# Patient Record
Sex: MALE | Race: WHITE | URBAN - METROPOLITAN AREA
[De-identification: names, ages, dates, MRNs, and addresses within clinical notes are randomized per-mention and may not be internally consistent; named-entity substitution may affect disease eponyms.]

---

## 2021-06-30 ENCOUNTER — OFFICE VISIT (OUTPATIENT)
Dept: ORTHOPEDIC SURGERY | Age: 41
End: 2021-06-30
Payer: COMMERCIAL

## 2021-06-30 VITALS — HEIGHT: 70 IN | BODY MASS INDEX: 24.05 KG/M2 | WEIGHT: 168 LBS

## 2021-06-30 DIAGNOSIS — M75.81 TENDINITIS OF RIGHT ROTATOR CUFF: ICD-10-CM

## 2021-06-30 DIAGNOSIS — M75.41 IMPINGEMENT SYNDROME OF RIGHT SHOULDER: ICD-10-CM

## 2021-06-30 DIAGNOSIS — M75.81 TENDINITIS OF RIGHT ROTATOR CUFF: Primary | ICD-10-CM

## 2021-06-30 DIAGNOSIS — M25.511 RIGHT SHOULDER PAIN, UNSPECIFIED CHRONICITY: Primary | ICD-10-CM

## 2021-06-30 DIAGNOSIS — M75.21 BICIPITAL TENDINITIS OF SHOULDER, RIGHT: ICD-10-CM

## 2021-06-30 PROCEDURE — 99204 OFFICE O/P NEW MOD 45 MIN: CPT | Performed by: PHYSICIAN ASSISTANT

## 2021-06-30 PROCEDURE — 20611 DRAIN/INJ JOINT/BURSA W/US: CPT | Performed by: PHYSICIAN ASSISTANT

## 2021-06-30 RX ORDER — TRIAMCINOLONE ACETONIDE 40 MG/ML
80 INJECTION, SUSPENSION INTRA-ARTICULAR; INTRAMUSCULAR ONCE
Status: COMPLETED | OUTPATIENT
Start: 2021-06-30 | End: 2021-06-30

## 2021-06-30 RX ORDER — BUPIVACAINE HYDROCHLORIDE 2.5 MG/ML
10 INJECTION, SOLUTION INFILTRATION; PERINEURAL ONCE
Status: COMPLETED | OUTPATIENT
Start: 2021-06-30 | End: 2021-06-30

## 2021-06-30 RX ORDER — LIDOCAINE HYDROCHLORIDE 10 MG/ML
40 INJECTION, SOLUTION INFILTRATION; PERINEURAL ONCE
Status: COMPLETED | OUTPATIENT
Start: 2021-06-30 | End: 2021-06-30

## 2021-06-30 RX ADMIN — BUPIVACAINE HYDROCHLORIDE 10 MG: 2.5 INJECTION, SOLUTION INFILTRATION; PERINEURAL at 08:33

## 2021-06-30 RX ADMIN — LIDOCAINE HYDROCHLORIDE 40 MG: 10 INJECTION, SOLUTION INFILTRATION; PERINEURAL at 08:35

## 2021-06-30 RX ADMIN — TRIAMCINOLONE ACETONIDE 80 MG: 40 INJECTION, SUSPENSION INTRA-ARTICULAR; INTRAMUSCULAR at 08:35

## 2021-06-30 NOTE — PROGRESS NOTES
of Transportation (Non-Medical):    Physical Activity:     Days of Exercise per Week:     Minutes of Exercise per Session:    Stress:     Feeling of Stress :    Social Connections:     Frequency of Communication with Friends and Family:     Frequency of Social Gatherings with Friends and Family:     Attends Hinduism Services:     Active Member of Clubs or Organizations:     Attends Club or Organization Meetings:     Marital Status:    Intimate Partner Violence:     Fear of Current or Ex-Partner:     Emotionally Abused:     Physically Abused:     Sexually Abused:      No current outpatient medications on file. Current Facility-Administered Medications   Medication Dose Route Frequency Provider Last Rate Last Admin    bupivacaine (MARCAINE) 0.25 % injection 10 mg  10 mg Intra-articular Once Charmaine Owens PA-C        lidocaine 1 % injection 40 mg  40 mg Intra-articular Once Charmaine Owens PA-C        triamcinolone acetonide (KENALOG-40) injection 80 mg  80 mg Intra-articular Once Charmaine Owens PA-C           Review of Systems:  Relevant 12 point review of systems dated 6/30/2021 was reviewed and are available in the patient's chart under the media tab. Vital Signs:  Ht 5' 10\" (1.778 m)   Wt 168 lb (76.2 kg)   BMI 24.11 kg/m²     General Exam:   Constitutional: Patient is adequately groomed with no evidence of malnutrition  DTRs: Deep tendon reflexes are intact  Mental Status: The patient is oriented to time, place and person. The patient's mood and affect are appropriate. Lymphatic: The lymphatic examination bilaterally reveals all areas to be without enlargement or induration. Vascular: Examination reveals no swelling or calf tenderness. Peripheral pulses are palpable and 2+. Neurological: The patient has good coordination. There is no weakness or sensory deficit. Right shoulder Examination:    Inspection:  No rashes, scars, or lesions. No deformity or atrophy.      Palpation: No tenderness over the bicipital groove or AC joint. Range of Motion: 170 degrees of forward flexion, 40 degrees of external rotation, internal rotation L2    Strength: 5/5 strength with internal rotation, external rotation, elevation, and abduction. Biceps and triceps strength is 5/5. Special Tests:  Positive Chambers and Neer impingement exam.  Negative speed sign. Negative crossover examination. Skin: There are no rashes, ulcerations or lesions. Gait: Normal gait pattern    Reflex normal deep tendon reflexes    Additional Comments:       Additional Examinations:         Contralateral Exam: Examination of the left shoulder reveals no atrophy or deformity. Skin is warm and dry. Range of motion is within normal limits. There is no focal tenderness with palpation. No AC joint tenderness. Negative Neer and Chambers-Gopal exams. Strength is graded 5/5 throughout. Neck: Examination of the neck does not show any tenderness, deformity or injury. Range of motion is unremarkable. There is no gross instability. There are no rashes, ulcerations or lesions. Strength and tone are normal.    Radiology:     X-rays obtained and reviewed in office:  Views 3 views including AP, Y, axillary  Location right shoulder  Impression there is a well-maintained glenohumeral joint with minimal arthritic changes. No fractures or dislocations. Impression:  Encounter Diagnoses   Name Primary?     Right shoulder pain, unspecified chronicity Yes    Tendinitis of right rotator cuff     Bicipital tendinitis of shoulder, right     Impingement syndrome of right shoulder        Office Procedures:  Orders Placed This Encounter   Procedures    XR SHOULDER RIGHT (MIN 2 VIEWS)     Standing Status:   Future     Number of Occurrences:   1     Standing Expiration Date:   6/30/2022     Order Specific Question:   Reason for exam:     Answer:   PAIN    US ARTHR/ASP/INJ MAJOR JNT/BURSA RIGHT     Standing Status:   Future     Number tendonitis. We reviewed the natural history of these conditions and treatment options ranging from conservative measures (rest, icing, activity modification, physical therapy, pain meds, cortisone injection) to surgical options. In terms of treatment, I recommended continuing with rest, icing, avoidance of painful activities, NSAIDs or pain meds as tolerated, and physical therapy. If these are not effective, cortisone injection can be considered. We discussed surgical options as well, should conservative measures fail.

## 2021-07-02 ENCOUNTER — HOSPITAL ENCOUNTER (OUTPATIENT)
Dept: PHYSICAL THERAPY | Age: 41
Setting detail: THERAPIES SERIES
Discharge: HOME | End: 2021-07-02
Payer: COMMERCIAL

## 2021-07-02 PROCEDURE — 97110 THERAPEUTIC EXERCISES: CPT | Performed by: PHYSICAL THERAPIST

## 2021-07-02 PROCEDURE — 97161 PT EVAL LOW COMPLEX 20 MIN: CPT | Performed by: PHYSICAL THERAPIST
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
## 2021-07-02 NOTE — FLOWSHEET NOTE
[facility], [address]  Phone: [phone]  Fax [phone]        Date:  2021    Patient Name:  Charline Friedman    :  1980  MRN: [id number]  Restrictions/Precautions:    Medical/Treatment Diagnosis Information:  · Diagnosis: right RTC tendinitis M75.81, right bicipital tendinitis M75.21  · Treatment Diagnosis: Shoulder pain X50.779  Insurance/Certification information:  PT Insurance Information: [address] deductible 80/20  0copay   60 PT/OT  Physician Information:  Referring Practitioner: Constanza Lala PA-C  Has the plan of care been signed (Y/N):        []  Yes  [x]  No     Date of Patient follow up with Physician: nsv      Is this a Progress Report:     []  Yes  [x]  No        If Yes:  Date Range for reporting period:  Beginnin21  Ending:    Progress report will be due (10 Rx or 30 days whichever is less): 86/       Recertification will be due (POC Duration  / 90 days whichever is less):       Visit # Insurance Allowable Auth Required   In-person 1 60 []  Yes []  No    Telehealth   []  Yes []  No    Total          Therapy Diagnosis/Practice Pattern:E       Number of Comorbidities:  [x]0     []1-2    []3+    Latex Allergy:  [x]NO      []YES  Preferred Language for Healthcare:   [x]English       []other:    SUBJECTIVE:  See eval    OBJECTIVE: See eval   Observation:    Test measurements:      RESTRICTIONS/PRECAUTIONS: n/a    Exercises/Interventions:     Therapeutic Ex (79086) Sets/sec Reps Notes/CUES         Serratus punch 10# 2 x 10  hep   Post cap s :20 3x  hep   SL ERC  3# 2 x 10  Hep    Prone ext 3# 2 x 10 Hep   Prone horiz abd 3# 2 x 10 hep   No $ GR  X 20 hep                                       Manual Intervention (.27.97.60)                                          NMR re-education (80216)   CUES NEEDED                                       Therapeutic Activity (03534)      Safe zone of shoulder reviewed  X  handout   Pt encouraged to lift LE. Pt cleared to do Rows. Pt free to use elliptical and TM. Cautioned about bike. Access Code: [email or web address]  URL: Groove/  Date: 81/89/2732  Prepared by: Mathew Rodrigues    Exercises  Supine Scapular Protraction in Flexion with Dumbbells - 1 x daily - 7 x weekly - 3 sets - 10 reps  Supine Cross Body Shoulder Stretch - 1 x daily - 7 x weekly - 1 sets - 3 reps - 20 hold  Sidelying Shoulder External Rotation - 1 x daily - 7 x weekly - 3 sets - 10 reps - 2 hold  Prone Shoulder Extension - Single Arm - 1 x daily - 7 x weekly - 2 sets - 10 reps - 2 hold  Prone Single Arm Shoulder Horizontal Abduction with Scapular Retraction and Palm Down - 1 x daily - 7 x weekly - 2 sets - 10 reps - 2 hold  Shoulder External Rotation and Scapular Retraction with Resistance - 1 x daily - 7 x weekly - 2 sets - 10 reps    Therapeutic Exercise and NMR EXR  [x] (52160) Provided verbal/tactile cueing for activities related to strengthening, flexibility, endurance, ROM  for improvements in scapular, scapulothoracic and UE control with self care, reaching, carrying, lifting, house/yardwork, driving/computer work.    [] (15636) Provided verbal/tactile cueing for activities related to improving balance, coordination, kinesthetic sense, posture, motor skill, proprioception  to assist with  scapular, scapulothoracic and UE control with self care, reaching, carrying, lifting, house/yardwork, driving/computer work. Therapeutic Activities:    [] (14368 or 89823) Provided verbal/tactile cueing for activities related to improving balance, coordination, kinesthetic sense, posture, motor skill, proprioception and motor activation to allow for proper function of scapular, scapulothoracic and UE control with self care, carrying, lifting, driving/computer work.      Home Exercise Program:    [x] (07825) Reviewed/Progressed HEP activities related to strengthening, flexibility, endurance, ROM of scapular, scapulothoracic and UE control with self care, reaching, carrying, lifting, house/yardwork, driving/computer work  [] (31099) Reviewed/Progressed HEP activities related to improving balance, coordination, kinesthetic sense, posture, motor skill, proprioception of scapular, scapulothoracic and UE control with self care, reaching, carrying, lifting, house/yardwork, driving/computer work      Manual Treatments:  PROM / STM / Oscillations-Mobs:  G-I, II, III, IV (PA's, Inf., Post.)  [x] (89430) Provided manual therapy to mobilize soft tissue/joints of cervical/CT, scapular GHJ and UE for the purpose of modulating pain, promoting relaxation,  increasing ROM, reducing/eliminating soft tissue swelling/inflammation/restriction, improving soft tissue extensibility and allowing for proper ROM for normal function with self care, reaching, carrying, lifting, house/yardwork, driving/computer work    Modalities:  N/a    Charges  Timed Code Treatment Minutes: 20   Total Treatment Minutes: 45     [x] EVAL (LOW) 65556   [] EVAL (MOD) 93286   [] EVAL (HIGH) 67817   [] RE-EVAL     [x] LA(96222) x   1  [] IONTO  [] NMR (65615) x     [] VASO  [] Manual (53369) x      [] Other:  [] TA x      [] Mech Traction (34265)  [] ES(attended) (60048)      [] ES (un) (70976):     GOALS:  Patient stated goal: improve shoulder. Learn healthy lifting habits. [] Progressing: [] Met: [] Not Met: [] Adjusted    Therapist goals for Patient:   Short Term Goals: To be achieved in: 2 weeks  1. Independent in HEP and progression per patient tolerance, in order to prevent re-injury. [] Progressing: [] Met: [] Not Met: [] Adjusted  2. Patient will have a decrease in pain to facilitate improvement in movement, function, and ADLs as indicated by Functional Deficits. [] Progressing: [] Met: [] Not Met: [] Adjusted    Long Term Goals: To be achieved in: 6 weeks  1.  Disability index score of 8% or less for the [facility] to assist with reaching prior level of function. [] Progressing: [] Met: [] Not Met: [] Adjusted  2. Patient will demonstrate increased AROM to be painfree with end range flex and IR to allow for proper joint functioning as indicated by patients Functional Deficits. [] Progressing: [] Met: [] Not Met: [] Adjusted  3. Patient will demonstrate an increase in Strength to 5/5 throughout to allow for proper functional mobility as indicated by patients Functional Deficits. [] Progressing: [] Met: [] Not Met: [] Adjusted  4. Patient will return to weight training with son without increased symptoms or restriction. [] Progressing: [] Met: [] Not Met: [] Adjusted  5. Able to reach behind back to tuck in shirt. Able to sleep 8 hours without waking from shoulder pain. [] Progressing: [] Met: [] Not Met: [] Adjusted     Progression Towards Functional goals:  [] Patient is progressing as expected towards functional goals listed. [] Progression is slowed due to complexities listed. [] Progression has been slowed due to co-morbidities. [x] Plan just implemented, too soon to assess goals progression  [] Other:     ASSESSMENT:  See eval    Overall Progression Towards Functional goals/ Treatment Progress Update:  [] Patient is progressing as expected towards functional goals listed. [] Progression is slowed due to complexities/Impairments listed. [] Progression has been slowed due to co-morbidities.   [x] Plan just implemented, too soon to assess goals progression <30days   [] Goals require adjustment due to lack of progress  [] Patient is not progressing as expected and requires additional follow up with physician  [] Other    Prognosis for POC: [x] Good [] Fair  [] Poor      Patient requires continued skilled intervention: [x] Yes  [] No    Treatment/Activity Tolerance:  [x] Patient able to complete treatment  [] Patient limited by fatigue  [] Patient limited by pain    [] Patient limited by other medical complications  [] Other:     Return to Play: (if applicable)   [x]  Stage 1: Intro to Strength   []  Stage 2: Return to Run and Strength   []  Stage 3: Return to Jump and Strength   []  Stage 4: Dynamic Strength and Agility   []  Stage 5: Sport Specific Training     []  Ready to Return to Play, Meets All Above Stages   []  Not Ready for Return to Sports   Comments:                           PLAN: See eval  [] Continue per plan of care [] Alter current plan (see comments above)  [x] Plan of care initiated [] Hold pending MD visit [] Discharge      Electronically signed by:  Nixon Ravi PT    Note: If patient does not return for scheduled/ recommended follow up visits, this note will serve as a discharge from care along with most recent update on progress.

## 2021-07-16 ENCOUNTER — HOSPITAL ENCOUNTER (OUTPATIENT)
Dept: PHYSICAL THERAPY | Age: 41
Setting detail: THERAPIES SERIES
Discharge: HOME | End: 2021-07-16
Payer: COMMERCIAL

## 2021-07-16 PROCEDURE — 97110 THERAPEUTIC EXERCISES: CPT | Performed by: PHYSICAL THERAPIST

## 2021-07-16 NOTE — FLOWSHEET NOTE
[facility], [address]  Phone: [phone]  Fax [phone]        Date:  2021    Patient Name:  Juan Carlos Gomes    :  1980  MRN: [id number]  Restrictions/Precautions:    Medical/Treatment Diagnosis Information:  · Diagnosis: right RTC tendinitis M75.81, right bicipital tendinitis M75.21  · Treatment Diagnosis: Shoulder pain O15.864  Insurance/Certification information:  PT Insurance Information: [employer] deductible 80/20  0copay   60 PT/OT  Physician Information:  Referring Practitioner: René Groves PA-C  Has the plan of care been signed (Y/N):        []  Yes  [x]  No     Date of Patient follow up with Physician: nsv      Is this a Progress Report:     []  Yes  [x]  No        If Yes:  Date Range for reporting period:  Beginnin21  Ending:    Progress report will be due (10 Rx or 30 days whichever is less): /       Recertification will be due (POC Duration  / 90 days whichever is less):       Visit # Insurance Allowable Auth Required   In-person 2 60 []  Yes []  No    Telehealth   []  Yes []  No    Total          Therapy Diagnosis/Practice Pattern:E       Number of Comorbidities:  [x]0     []1-2    []3+    Latex Allergy:  [x]NO      []YES  Preferred Language for Healthcare:   [x]English       []other:    SUBJECTIVE:  Pt is pleased with progress. Pt has very little pain.   Feels like he is ready to return to gym program.    OBJECTIVE: See eval   Observation:    Test measurements:      RESTRICTIONS/PRECAUTIONS: n/a    Exercises/Interventions:     Therapeutic Ex (52753) Sets/sec Reps Notes/CUES         Serratus punch 10# 2 x 10  hep   Post cap s :20 3x  hep   SL ERC  5# 2 x 10  Hep    Prone ext 5# 2 x 10 Hep   Prone horiz abd 5# 2 x 10 hep   No $ BL  X 20 hep   TB IR/  ER BL X 20     Supine horiz abd BL X 20    Supine PNF BL X 20     CC Row 110# 2 x 10    CC # 2 x 10    CC triceps 80# driving/computer work.    [] (67207) Provided verbal/tactile cueing for activities related to improving balance, coordination, kinesthetic sense, posture, motor skill, proprioception  to assist with  scapular, scapulothoracic and UE control with self care, reaching, carrying, lifting, house/yardwork, driving/computer work. Therapeutic Activities:    [] (62114 or 57046) Provided verbal/tactile cueing for activities related to improving balance, coordination, kinesthetic sense, posture, motor skill, proprioception and motor activation to allow for proper function of scapular, scapulothoracic and UE control with self care, carrying, lifting, driving/computer work.      Home Exercise Program:    [x] (43928) Reviewed/Progressed HEP activities related to strengthening, flexibility, endurance, ROM of scapular, scapulothoracic and UE control with self care, reaching, carrying, lifting, house/yardwork, driving/computer work  [] (83959) Reviewed/Progressed HEP activities related to improving balance, coordination, kinesthetic sense, posture, motor skill, proprioception of scapular, scapulothoracic and UE control with self care, reaching, carrying, lifting, house/yardwork, driving/computer work      Manual Treatments:  PROM / STM / Oscillations-Mobs:  G-I, II, III, IV (PA's, Inf., Post.)  [x] (52459) Provided manual therapy to mobilize soft tissue/joints of cervical/CT, scapular GHJ and UE for the purpose of modulating pain, promoting relaxation,  increasing ROM, reducing/eliminating soft tissue swelling/inflammation/restriction, improving soft tissue extensibility and allowing for proper ROM for normal function with self care, reaching, carrying, lifting, house/yardwork, driving/computer work    Modalities:  N/a    Charges  Timed Code Treatment Minutes: 35   Total Treatment Minutes: 35     [] EVAL (LOW) 95232   [] EVAL (MOD) 89855   [] EVAL (HIGH) 65743   [] RE-EVAL     [x] SLOAN(08709) x   2  [] IONTO  [] NMR (50392) x     [] VASO  [x] Manual (96695) x      [] Other:  [] TA x      [] Mech Traction (55464)  [] ES(attended) (43344)      [] ES (un) (22620):     GOALS:  Patient stated goal: improve shoulder. Learn healthy lifting habits. [] Progressing: [] Met: [] Not Met: [] Adjusted    Therapist goals for Patient:   Short Term Goals: To be achieved in: 2 weeks  1. Independent in HEP and progression per patient tolerance, in order to prevent re-injury. [] Progressing: [] Met: [] Not Met: [] Adjusted  2. Patient will have a decrease in pain to facilitate improvement in movement, function, and ADLs as indicated by Functional Deficits. [] Progressing: [] Met: [] Not Met: [] Adjusted    Long Term Goals: To be achieved in: 6 weeks  1. Disability index score of 8% or less for the [facility] to assist with reaching prior level of function. [] Progressing: [] Met: [] Not Met: [] Adjusted  2. Patient will demonstrate increased AROM to be painfree with end range flex and IR to allow for proper joint functioning as indicated by patients Functional Deficits. [] Progressing: [] Met: [] Not Met: [] Adjusted  3. Patient will demonstrate an increase in Strength to 5/5 throughout to allow for proper functional mobility as indicated by patients Functional Deficits. [] Progressing: [] Met: [] Not Met: [] Adjusted  4. Patient will return to weight training with son without increased symptoms or restriction. [] Progressing: [] Met: [] Not Met: [] Adjusted  5. Able to reach behind back to tuck in shirt. Able to sleep 8 hours without waking from shoulder pain. [] Progressing: [] Met: [] Not Met: [] Adjusted     Progression Towards Functional goals:  [x] Patient is progressing as expected towards functional goals listed. [] Progression is slowed due to complexities listed. [] Progression has been slowed due to co-morbidities.   [] Plan just implemented, too soon to assess goals progression  [] Other:     ASSESSMENT:   Pt may benefit from [facility] to meet all goals. Overall Progression Towards Functional goals/ Treatment Progress Update:  [x] Patient is progressing as expected towards functional goals listed. [] Progression is slowed due to complexities/Impairments listed. [] Progression has been slowed due to co-morbidities. [] Plan just implemented, too soon to assess goals progression <30days   [] Goals require adjustment due to lack of progress  [] Patient is not progressing as expected and requires additional follow up with physician  [] Other    Prognosis for POC: [x] Good [] Fair  [] Poor      Patient requires continued skilled intervention: [x] Yes  [] No    Treatment/Activity Tolerance:  [x] Patient able to complete treatment  [] Patient limited by fatigue  [] Patient limited by pain    [] Patient limited by other medical complications  [] Other:     Return to Play: (if applicable)   [x]  Stage 1: Intro to Strength   []  Stage 2: Return to Run and Strength   []  Stage 3: Return to Jump and Strength   []  Stage 4: Dynamic Strength and Agility   []  Stage 5: Sport Specific Training     []  Ready to Return to Play, Meets All Above Stages   []  Not Ready for Return to Sports   Comments:                           PLAN: See eval  [x] Continue per plan of care [] Alter current plan (see comments above)  [] Plan of care initiated [] Hold pending MD visit [] Discharge      Electronically signed by:  Joanie Vaca PT    Note: If patient does not return for scheduled/ recommended follow up visits, this note will serve as a discharge from care along with most recent update on progress.

## 2021-08-02 ENCOUNTER — OFFICE VISIT (OUTPATIENT)
Dept: ORTHOPEDIC SURGERY | Age: 41
End: 2021-08-02
Payer: COMMERCIAL

## 2021-08-02 VITALS — HEIGHT: 70 IN | WEIGHT: 168 LBS | BODY MASS INDEX: 24.05 KG/M2

## 2021-08-02 DIAGNOSIS — T14.8XXA HEMATOMA: ICD-10-CM

## 2021-08-02 DIAGNOSIS — S46.211A STRAIN OF RIGHT BICEPS, INITIAL ENCOUNTER: ICD-10-CM

## 2021-08-02 DIAGNOSIS — S46.011A STRAIN OF RIGHT ROTATOR CUFF CAPSULE, INITIAL ENCOUNTER: ICD-10-CM

## 2021-08-02 DIAGNOSIS — M25.511 RIGHT SHOULDER PAIN, UNSPECIFIED CHRONICITY: Primary | ICD-10-CM

## 2021-08-02 PROCEDURE — 99214 OFFICE O/P EST MOD 30 MIN: CPT | Performed by: PHYSICIAN ASSISTANT

## 2021-08-02 NOTE — PROGRESS NOTES
 Lack of Transportation (Medical):  Lack of Transportation (Non-Medical):    Physical Activity:     Days of Exercise per Week:     Minutes of Exercise per Session:    Stress:     Feeling of Stress :    Social Connections:     Frequency of Communication with Friends and Family:     Frequency of Social Gatherings with Friends and Family:     Attends Baptism Services:     Active Member of Clubs or Organizations:     Attends Club or Organization Meetings:     Marital Status:    Intimate Partner Violence:     Fear of Current or Ex-Partner:     Emotionally Abused:     Physically Abused:     Sexually Abused:        Review of Systems:  Relevant 12 point review of systems dated 8/2/2021 was reviewed and are available in the patient's chart under the media tab. Vital Signs:  Ht 5' 10\" (1.778 m)   Wt 168 lb (76.2 kg)   BMI 24.11 kg/m²     General Exam:   Constitutional: Patient is adequately groomed with no evidence of malnutrition  DTRs: Deep tendon reflexes are intact  Mental Status: The patient is oriented to time, place and person. The patient's mood and affect are appropriate. Lymphatic: The lymphatic examination bilaterally reveals all areas to be without enlargement or induration. Vascular: Examination reveals no swelling or calf tenderness. Peripheral pulses are palpable and 2+. Neurological: The patient has good coordination. There is no weakness or sensory deficit. Right shoulder Examination:    Inspection:  No rashes, scars, or lesions. No deformity or atrophy. Patient does have a significant amount of ecchymosis involving the majority of the right upper extremity. Palpation: Tenderness directly over the belly of the bicep muscle. Minimal tenderness over the distal tendon and proximal tendon.     Range of Motion: 170 degrees of forward flexion, 70 degrees of external rotation, internal rotation L1    Strength: 5/5 strength with internal rotation, external rotation, elevation, and abduction. Biceps and triceps strength is 5/5. Special Tests:  Positive Chambers and Neer impingement exam.  Negative speed sign. Negative crossover examination. Negative hook test for distal bicep tendon tear. Negative Edward sign. Skin: There are no rashes, ulcerations or lesions. Gait: Normal gait pattern    Reflex normal deep tendon reflexes    Additional Comments:       Additional Examinations:         Contralateral Exam: Examination of the left shoulder reveals no atrophy or deformity. Skin is warm and dry. Range of motion is within normal limits. There is no focal tenderness with palpation. No AC joint tenderness. Negative Neer and Chambers-Gopal exams. Strength is graded 5/5 throughout. Neck: Examination of the neck does not show any tenderness, deformity or injury. Range of motion is unremarkable. There is no gross instability. There are no rashes, ulcerations or lesions. Strength and tone are normal.    Radiology:     X-rays obtained and reviewed in office:  Views 3 views including AP, Y, axillary  Location right shoulder  Impression there is a well-maintained glenohumeral joint with minimal arthritic changes. No fractures or dislocations. Impression:  Encounter Diagnoses   Name Primary?     Right shoulder pain, unspecified chronicity Yes    Strain of right rotator cuff capsule, initial encounter     Strain of right biceps, initial encounter     Hematoma        Office Procedures:  Orders Placed This Encounter   Procedures    XR SHOULDER RIGHT (MIN 2 VIEWS)     Standing Status:   Future     Number of Occurrences:   1     Standing Expiration Date:   8/2/2022     Order Specific Question:   Reason for exam:     Answer:   pain    MRI HUMERUS LEFT WO CONTRAST     Standing Status:   Future     Standing Expiration Date:   8/2/2022     Scheduling Instructions:      [facility]     Order Specific Question:   Reason for exam:     Answer:   R/O BICEPS TENDON TEAR Treatment Plan: Patient is definitely suffering from a hematoma. It is unclear with the amount of swelling he is having where exactly his injury is. We have ordered an MRI of the humerus and the patient will follow up in office after MRI. He is informed that based on this MRI he may need a more dedicated film either distal approximately. We will see the patient back in office after MRI. In the meantime continue to rest ice and elevate for pain and swelling control. I discussed with Callie Garcia that his history, symptoms, signs and imaging are most consistent with hematoma and bicep injury    We reviewed the natural history of these conditions and treatment options ranging from conservative measures (rest, icing, activity modification, physical therapy, pain meds, cortisone injection) to surgical options. Based on his symptoms I recommended the following imaging studies: MRI. Script was provided. After imaging is completed, patient will make a follow up appointment to review imaging. In terms of treatment, I recommended continuing with rest, icing, avoidance of painful activities, NSAIDs or pain meds as tolerated, and physical therapy. We discussed surgical options as well, should conservative measures fail.

## 2021-08-04 ENCOUNTER — TELEPHONE (OUTPATIENT)
Dept: ORTHOPEDIC SURGERY | Age: 41
End: 2021-08-04

## 2021-08-04 NOTE — TELEPHONE ENCOUNTER
General Question     Subject: REQ MRI ORDER APPROVAL CAN BE EXPEDITED FOR R [facility]SCARR   Patient and /or Facility Request: PATIENT   Contact Number: [phone]    TO EXPEDITE ORDER THIS IS THE CONTACT # [phone]

## 2021-08-09 ENCOUNTER — OFFICE VISIT (OUTPATIENT)
Dept: ORTHOPEDIC SURGERY | Age: 41
End: 2021-08-09
Payer: COMMERCIAL

## 2021-08-09 VITALS — HEIGHT: 70 IN | BODY MASS INDEX: 24.05 KG/M2 | WEIGHT: 168 LBS

## 2021-08-09 DIAGNOSIS — S46.211A STRAIN OF RIGHT BICEPS, INITIAL ENCOUNTER: ICD-10-CM

## 2021-08-09 DIAGNOSIS — S46.011A STRAIN OF RIGHT ROTATOR CUFF CAPSULE, INITIAL ENCOUNTER: ICD-10-CM

## 2021-08-09 DIAGNOSIS — M75.21 BICIPITAL TENDINITIS OF SHOULDER, RIGHT: ICD-10-CM

## 2021-08-09 DIAGNOSIS — T14.8XXA HEMATOMA: ICD-10-CM

## 2021-08-09 DIAGNOSIS — M25.511 RIGHT SHOULDER PAIN, UNSPECIFIED CHRONICITY: Primary | ICD-10-CM

## 2021-08-09 PROCEDURE — 99213 OFFICE O/P EST LOW 20 MIN: CPT | Performed by: PHYSICIAN ASSISTANT

## 2021-08-09 NOTE — PROGRESS NOTES
Chief Complaint    Follow-up (Shoulder Right )      History of Present Illness:  Juan Rodriguez is a 38 y.o. male presents today for follow-up visit. Patient is here concerning his right shoulder. Approximately 12 days ago he suffered the below injury. We saw him last time and ordered him an MRI. The MRI has not yet been approved. He states that his swelling has improved but continues to have a significant amount of ecchymosis. He does have difficulties with overhead activities. He has noticed the deformity throughout his bicep. Previous history: Patient presents to the office today for a new problem. Patient is here with a chief complaint of right shoulder pain. Patient's right shoulder became painful approximately 5 days ago. He was knee boarding and got tangled up in the rope. It wrapped around his arm and into his wrist.  He did have x-rays taken in [place] but does not have them with him. Patient was informed there was no fracture. His pain is more concentrated over the mid humerus. He does complain of a significant amount of ecchymosis. His symptoms are improving with conservative care of ibuprofen and rest.  He does have a past medical history significant for bicipital tendinitis and shoulder impingement. This was treated successfully with a cortisone injection and physical therapy. Medical History:  History reviewed. No pertinent past medical history. There are no problems to display for this patient. History reviewed. No pertinent surgical history. History reviewed. No pertinent family history.   Social History     Socioeconomic History    Marital status: Single     Spouse name: None    Number of children: None    Years of education: None    Highest education level: None   Occupational History    None   Tobacco Use    Smoking status: Never Smoker    Smokeless tobacco: Never Used   Substance and Sexual Activity    Alcohol use: None    Drug use: None    Sexual activity: None   Other Topics Concern    None   Social History Narrative    None     Social Determinants of Health     Financial Resource Strain:     Difficulty of Paying Living Expenses:    Food Insecurity:     Worried About Running Out of Food in the Last Year:     [address] in the Last Year:    Transportation Needs:     Lack of Transportation (Medical):  Lack of Transportation (Non-Medical):    Physical Activity:     Days of Exercise per Week:     Minutes of Exercise per Session:    Stress:     Feeling of Stress :    Social Connections:     Frequency of Communication with Friends and Family:     Frequency of Social Gatherings with Friends and Family:     Attends Zoroastrianism Services:     Active Member of Clubs or Organizations:     Attends Club or Organization Meetings:     Marital Status:    Intimate Partner Violence:     Fear of Current or Ex-Partner:     Emotionally Abused:     Physically Abused:     Sexually Abused:        Review of Systems:  Relevant 12 point review of systems dated 8/2/2021 was reviewed and are available in the patient's chart under the media tab. Vital Signs:  Ht 5' 10\" (1.778 m)   Wt 168 lb (76.2 kg)   BMI 24.11 kg/m²     General Exam:   Constitutional: Patient is adequately groomed with no evidence of malnutrition  DTRs: Deep tendon reflexes are intact  Mental Status: The patient is oriented to time, place and person. The patient's mood and affect are appropriate. Lymphatic: The lymphatic examination bilaterally reveals all areas to be without enlargement or induration. Vascular: Examination reveals no swelling or calf tenderness. Peripheral pulses are palpable and 2+. Neurological: The patient has good coordination. There is no weakness or sensory deficit. Right shoulder Examination:    Inspection:  No rashes, scars, or lesions. No deformity or atrophy.   Patient does have a significant amount of ecchymosis involving the majority of the right upper extremity. Palpation: Tenderness directly over the belly of the bicep muscle. Minimal tenderness over the distal tendon and proximal tendon. Range of Motion: 170 degrees of forward flexion, 70 degrees of external rotation, internal rotation L1    Strength: 5/5 strength with internal rotation, external rotation, elevation, and abduction. Biceps and triceps strength is 5/5. Special Tests:  Positive Chambers and Neer impingement exam.  Negative speed sign. Negative crossover examination. Negative hook test for distal bicep tendon tear. Negative Edward sign. Skin: There are no rashes, ulcerations or lesions. Gait: Normal gait pattern    Reflex normal deep tendon reflexes    Additional Comments:       Additional Examinations:         Contralateral Exam: Examination of the left shoulder reveals no atrophy or deformity. Skin is warm and dry. Range of motion is within normal limits. There is no focal tenderness with palpation. No AC joint tenderness. Negative Neer and Chambers-Gopal exams. Strength is graded 5/5 throughout. Neck: Examination of the neck does not show any tenderness, deformity or injury. Range of motion is unremarkable. There is no gross instability. There are no rashes, ulcerations or lesions. Strength and tone are normal.    Radiology:     Previous X-rays obtained and reviewed in office:  Views 3 views including AP, Y, axillary  Location right shoulder  Impression there is a well-maintained glenohumeral joint with minimal arthritic changes. No fractures or dislocations. Impression:  Encounter Diagnoses   Name Primary?  Right shoulder pain, unspecified chronicity Yes    Bicipital tendinitis of shoulder, right     Strain of right rotator cuff capsule, initial encounter     Strain of right biceps, initial encounter     Hematoma        Office Procedures:  No orders of the defined types were placed in this encounter.       Treatment Plan:       As per last note I do believe patient has a significant right upper extremity injury and is in need of an MRI. With the injury now being 15days old there is some urgency to this. The longer the patient goes without a definitive diagnosis will make it harder to treat his problem. Patient is definitely suffering from a hematoma. It is unclear with the amount of swelling he is having where exactly his injury is. We have ordered an MRI of the humerus and the patient will follow up in office after MRI. He is informed that based on this MRI he may need a more dedicated film either distal approximately. We will see the patient back in office after MRI. In the meantime continue to rest ice and elevate for pain and swelling control. I discussed with Lorin Olvera that his history, symptoms, signs and imaging are most consistent with hematoma and bicep injury    We reviewed the natural history of these conditions and treatment options ranging from conservative measures (rest, icing, activity modification, physical therapy, pain meds, cortisone injection) to surgical options. Based on his symptoms I recommended the following imaging studies: MRI. Script was provided. After imaging is completed, patient will make a follow up appointment to review imaging. In terms of treatment, I recommended continuing with rest, icing, avoidance of painful activities, NSAIDs or pain meds as tolerated, and physical therapy. We discussed surgical options as well, should conservative measures fail.

## 2021-08-12 DIAGNOSIS — S46.011A STRAIN OF RIGHT ROTATOR CUFF CAPSULE, INITIAL ENCOUNTER: ICD-10-CM

## 2021-08-12 DIAGNOSIS — S46.211A STRAIN OF RIGHT BICEPS, INITIAL ENCOUNTER: ICD-10-CM

## 2021-08-12 DIAGNOSIS — M75.21 BICIPITAL TENDINITIS OF SHOULDER, RIGHT: Primary | ICD-10-CM

## 2021-08-13 ENCOUNTER — OFFICE VISIT (OUTPATIENT)
Dept: ORTHOPEDIC SURGERY | Age: 41
End: 2021-08-13
Payer: COMMERCIAL

## 2021-08-13 VITALS — BODY MASS INDEX: 24.05 KG/M2 | HEIGHT: 70 IN | WEIGHT: 168 LBS

## 2021-08-13 DIAGNOSIS — S46.211A RUPTURE OF RIGHT BICEPS TENDON, INITIAL ENCOUNTER: ICD-10-CM

## 2021-08-13 DIAGNOSIS — T14.8XXA HEMATOMA: Primary | ICD-10-CM

## 2021-08-13 PROCEDURE — 99214 OFFICE O/P EST MOD 30 MIN: CPT | Performed by: ORTHOPAEDIC SURGERY

## 2021-08-13 NOTE — PROGRESS NOTES
Dr Nichole Ramírez      Date /Time 8/13/2021             10:53 AM EDT  Name Callie Garcia             1980   Location  [facility]  MRN [id number]                Chief Complaint   Patient presents with    Results     TR MRI RIGHT HUMERUS        History of Present Illness    Callie Garcia is a 38 y.o. male who presents with  right Shoulder pain. Current history: Patient presents to the office today for a new problem to me. He previously seen by physician assistant Lori Rondon. Patient did have a significant injury knee boarding. He is here to review MRI results of the humerus. Historically from the first day of injury to now things have changed dramatically. His swelling has improved. He does notice a deformity of his bicep. He denies any neurologic symptoms. He does have increased pain with range of motion of the shoulder. Previous history: presents today for follow-up visit. Patient is here concerning his right shoulder. Approximately 12 days ago he suffered the below injury. We saw him last time and ordered him an MRI. The MRI has not yet been approved. He states that his swelling has improved but continues to have a significant amount of ecchymosis. He does have difficulties with overhead activities. He has noticed the deformity throughout his bicep. Previous history: Patient presents to the office today for a new problem. Patient is here with a chief complaint of right shoulder pain. Patient's right shoulder became painful approximately 5 days ago. He was knee boarding and got tangled up in the rope. It wrapped around his arm and into his wrist.  He did have x-rays taken in [place] but does not have them with him. Patient was informed there was no fracture. His pain is more concentrated over the mid humerus. He does complain of a significant amount of ecchymosis.   His symptoms are improving with conservative care of ibuprofen and rest.  He does have a past medical history significant for bicipital tendinitis and shoulder impingement. This was treated successfully with a cortisone injection and physical therapy. Past Medical History  No past medical history on file. No past surgical history on file. Social History     Tobacco Use    Smoking status: Never Smoker    Smokeless tobacco: Never Used   Substance Use Topics    Alcohol use: Not on file      No current outpatient medications on file prior to visit. No current facility-administered medications on file prior to visit. ASCVD 10-YEAR RISK SCORE  The ASCVD Risk score (Vikram Boss, et al., 2013) failed to calculate for the following reasons: The systolic blood pressure is missing    Cannot find a previous HDL lab    Cannot find a previous total cholesterol lab     Review of Systems  10-point ROS is negative other than HPI. Physical Exam  Based off 1997 Exam Criteria  Ht 5' 10\" (1.778 m)   Wt 168 lb (76.2 kg)   BMI 24.11 kg/m²      Constitutional:       General: He is not in acute distress. Appearance: Normal appearance. Cardiovascular:      Rate and Rhythm: Normal rate and regular rhythm. Pulses: Normal pulses. Pulmonary:      Effort: Pulmonary effort is normal. No respiratory distress. Neurological:      Mental Status: He is alert and oriented to person, place, and time. Mental status is at baseline.      Musculoskeletal:  Gait:  normal    Cervical Spine / Shoulder:      RIGHT  LEFT    Cervical Spine Exam  [x] All Neg    [x] All Neg     Spurling's  []  []Not tested   []  []Not tested    Moreno's  []  []Not tested   []  []Not tested    Pain with rotation  []  []Not tested   []  []Not tested    Pain with lateral bending  []  []Not tested   []  []Not tested    Paraspinal muscle tenderness  [] Paraspinal  []Midline   [] Paraspinal  []Not tested    Sensation RIGHT  LEFT    Axillary  [x] Normal []Decreased    [x] Normal []Decreased   Musculocutaneous  [x] Normal  []Decreased   [x] Normal []Decreased   Median  [x] Normal []Decreased   [x] Normal []Decreased   Radial  [x] Normal  []Decreased   [x] Normal []Decreased   Ulnar  [x] Normal  []Decreased   [x] Normal []Decreased   Scapula       Position  [x]Nml  []low  [] lateral  [x]Nml  []low  [] lateral   Dyskinesia  []+ []Abn. Shrug   []+ []Abn. Shrug                     Winging     [x]None   []Med  []Lat   []Worse w/FE  []Med  []Lat  []Worse w/FE   Scapulothoracic Compress.    []Impr Pain  []Impr Motion  []Impr Pain []Impr Motion    Range of Motion Active Passive Active Passive   Forward Elevation 170  170    Abduction 100  100    External Rotation @ side 60  60    External Rotation @ 90 abd 90  90    Internal Rotation @ 90 abd 40  40    Internal Rotation T12  T12    End range of motion  [] Pain  [] Pain  [] Pain  [] Pain   Strength RIGHT /5 LEFT /5   Abduction 5  5    External Rotation 5  5    Internal Rotation 5  5    Provocative Signs/Tests  [] All Neg   [x] +      [] -  [] All Neg   [x] +      [] -   Rotator Cuff Signs  [] All Neg  [] Not tested   [x] All Neg  [] Not tested    Neer  []  []Not tested   []  []Not tested    Miladis Bathe  []  []Not tested   []  []Not tested    Painful arc  [x]  []Not tested   []  []Not tested    Greater tuberosity tenderness  []  []Not tested   []  []Not tested    Drop arm  []  []Not tested  []  []Not tested   Superior Escape  []  []Not tested   []  []Not tested    ER Lag  []  []Not tested   []  []Not tested    Belly press  [x]  []Not tested   []  []Not tested    Lift-off  []  []Not tested   []  []Not tested    Bear hug  []  []Not tested   []  []Not tested    Biceps/Labral Signs  [] All Neg  [] Not tested   [x] All Neg  [] Not tested    Hagen's  [x]  []Not tested   []  []Not tested    Speed's  [x]  []Not tested   []  []Not tested    Dynamic Load Shift/Shear  []  []Not tested   []  []Not tested    Clicking/Popping  []  []Not tested  []  []Not tested   Bicipital groove tenderness  []  []Not tested   []  []Not tested    Edward []  []Not tested   []  []Not tested    [facility] Joint Signs  [x] All Neg  [] Not tested   [x] All Neg  [] Not tested    [facility] joint tenderness  []  []Not tested   []  []Not tested    Cross-arm adduction pain  []  []Not tested   []  []Not tested    Instability Signs  [] All Neg  [] Not tested  [] All Neg  [] Not tested   General laxity (thumb/elbow)  []  []Not tested   []  []Not tested    Hyperabduction  []  []Not tested   []  []Not tested    Sulcus []Side   []ER    []Side   []ER       Anterior apprehension  []  []Not tested   []  []Not tested    Relocation  []   []Not tested  []  []Not tested     Further physical exam of the right shoulder because demonstrate obvious deformity the bicep muscle. He has a positive Edward sign. Significant ecchymosis in the right upper extremity from the shoulder into forearm    Imaging  Right Shoulder: [facility] AT [place]  Radiographs: X-rays were reviewed that were taken previously of the right shoulder. No evidence of fractures or dislocations. There is inferior subluxation of the humeral head. MRI results humerus    Site: [employer] [facility] #: [id number] #: [id number] Procedure: MR Right Upper Arm w/o Contrast ; Reason for Exam: right shoulder pain, r/o biceps tendon tear   This document is confidential medical information.  Unauthorized disclosure or use of this information is prohibited by law. If you are not the intended recipient of this document, please advise us by calling immediately [phone].       [email or web address] [facility]   [address]           Patient Name: Raheel Alford   Case ID: [id number]   Patient : 1980   Referring Physician: Cm Dutton, [facility]   Exam Date: 2021   Exam Description: MR Right Upper Arm w/o Contrast            HISTORY:  Right shoulder pain, evaluate for biceps tendon tear.       TECHNICAL FACTORS:  Long- and short-axis fat- and water-weighted images were performed.       COMPARISON:  None.       FINDINGS:  Complex fluid collection dissects along the anterior and medial aspect of the    proximal humerus.  Region of hematoma measuring approximately 7.1 x 2.65 x 16.4cm present.     Fluid level present within this collection typical of hematoma.  The subjacent soft tissues are    inflamed.       Fluid collection dissects between the short head and long head of the biceps.  Short head    biceps origin is torn.  It is likely completely torn.  Dedicated imaging at the level of the    shoulder would be confirmatory.  Muscle and myofascial injury involves the short head and less    so long head biceps muscles.       No pectoralis tendon tear.       CONCLUSION:   1. Large complex fluid collection or hematoma measuring 7.1 x 2.65 x 16.4cm.  Anterior proximal    humerus dissects between the short head and long head biceps muscles.  Muscle and myofascial    injury present.  Short head biceps tendon likely torn from the coracoid.  Dedicated shoulder    MRI recommended to confirm. 2. Diffuse circumferential swelling dissects down the arm.         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 Cannot visualize coracoid process in the current humerus MRI. Assessment and Plan  Juana Henderson was seen today for results. Diagnoses and all orders for this visit:    Bicipital tendinitis of shoulder, right    Strain of right rotator cuff capsule, initial encounter    Strain of right biceps, initial encounter    Hematoma        Patient does have significant bicipital tendinitis and most likely a full-thickness tear of the short head. It is not visualized well on the MRI of the humerus. He does have a significant hematoma. Would recommend and have ordered the patient an MRI of the right shoulder to fully evaluate the long and short head of the bicep tendon. Also fully evaluate the rotator cuff for possible tearing.   We will see him back in the office after the MRI to review results. In the meantime he will continue with conservative measures of rest ice and elevation for pain and swelling control. 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 No evidence of neurologic compromise. We reviewed the natural history of these conditions and treatment options ranging from conservative measures (rest, icing, activity modification, physical therapy, pain meds, cortisone injection) to surgical options. Based on his symptoms I recommended the following imaging studies: MRI right shoulder. Script was provided. This is critical to acquire as recommended by the radiology read. He has a very rare rupture of the conjoined tendon of the shoulder. We need to characterize this better proximally to visualize this coming off the coracoid process, and visualized the integrity of the brachial plexus. After imaging is completed, patient will make a follow up appointment to review imaging. In terms of treatment, I recommended continuing with rest, icing, avoidance of painful activities, NSAIDs or pain meds as tolerated, and gentle stretching therapy. We discussed surgical options as well, should conservative measures fail. We discussed briefly the possibility of repair of this surgically. I think this is a highly risky operation. Anatomically repaired the conjoined tendon without injuring the brachial plexus in any way would be very challenging. I will discuss this case with other colleagues within the shoulder world and review literature. I suspect this is a very rare finding. Electronically signed by Magdalene Chacko MD on 8/13/2021 at 10:53 AM  This dictation was generated by voice recognition computer software. Although all attempts are made to edit the dictation for accuracy, there may be errors in the transcription that are not intended.

## 2021-08-16 ENCOUNTER — TELEPHONE (OUTPATIENT)
Dept: ORTHOPEDIC SURGERY | Age: 41
End: 2021-08-16

## 2021-08-24 ENCOUNTER — OFFICE VISIT (OUTPATIENT)
Dept: ORTHOPEDIC SURGERY | Age: 41
End: 2021-08-24
Payer: COMMERCIAL

## 2021-08-24 VITALS — BODY MASS INDEX: 24.05 KG/M2 | HEIGHT: 70 IN | WEIGHT: 168 LBS

## 2021-08-24 DIAGNOSIS — S46.211A RUPTURE OF RIGHT BICEPS TENDON, INITIAL ENCOUNTER: ICD-10-CM

## 2021-08-24 DIAGNOSIS — T14.8XXA HEMATOMA: Primary | ICD-10-CM

## 2021-08-24 PROCEDURE — 99213 OFFICE O/P EST LOW 20 MIN: CPT | Performed by: ORTHOPAEDIC SURGERY

## 2021-08-24 NOTE — PROGRESS NOTES
Dr Ban Rodney      Date /Time 8/24/2021             10:53 AM EDT  Name Jesus Mcconnell             1980   Location  [facility]  MRN [id number]                Chief Complaint   Patient presents with    Results     TR MRI RIGHT SHOULDER         History of Present Illness    Jesus Mcconnell is a 38 y.o. male who presents with  right Shoulder pain. Current history: Patient presents the office today for follow-up visit. Patient is here for MRI results involving his right shoulder. He states he continues to improve on a nearly daily basis. He is having less pain and swelling. He denies any neurologic symptoms. Previous history: Patient presents to the office today for a new problem to me. He previously seen by physician assistant Faisal Reid. Patient did have a significant injury knee boarding. He is here to review MRI results of the humerus. Historically from the first day of injury to now things have changed dramatically. His swelling has improved. He does notice a deformity of his bicep. He denies any neurologic symptoms. He does have increased pain with range of motion of the shoulder. Previous history: presents today for follow-up visit. Patient is here concerning his right shoulder. Approximately 12 days ago he suffered the below injury. We saw him last time and ordered him an MRI. The MRI has not yet been approved. He states that his swelling has improved but continues to have a significant amount of ecchymosis. He does have difficulties with overhead activities. He has noticed the deformity throughout his bicep. Previous history: Patient presents to the office today for a new problem. Patient is here with a chief complaint of right shoulder pain. Patient's right shoulder became painful approximately 5 days ago. He was knee boarding and got tangled up in the rope.   It wrapped around his arm and into his wrist.  He did have x-rays taken in [place] but does not have them with him. Patient was informed there was no fracture. His pain is more concentrated over the mid humerus. He does complain of a significant amount of ecchymosis. His symptoms are improving with conservative care of ibuprofen and rest.  He does have a past medical history significant for bicipital tendinitis and shoulder impingement. This was treated successfully with a cortisone injection and physical therapy. Past Medical History  No past medical history on file. No past surgical history on file. Social History     Tobacco Use    Smoking status: Never Smoker    Smokeless tobacco: Never Used   Substance Use Topics    Alcohol use: Not on file      No current outpatient medications on file prior to visit. No current facility-administered medications on file prior to visit. ASCVD 10-YEAR RISK SCORE  The ASCVD Risk score (Sushma Stauffer., et al., 2013) failed to calculate for the following reasons: The systolic blood pressure is missing    Cannot find a previous HDL lab    Cannot find a previous total cholesterol lab     Review of Systems  10-point ROS is negative other than HPI. Physical Exam  Based off 1997 Exam Criteria  Ht 5' 10\" (1.778 m)   Wt 168 lb (76.2 kg)   BMI 24.11 kg/m²      Constitutional:       General: He is not in acute distress. Appearance: Normal appearance. Cardiovascular:      Rate and Rhythm: Normal rate and regular rhythm. Pulses: Normal pulses. Pulmonary:      Effort: Pulmonary effort is normal. No respiratory distress. Neurological:      Mental Status: He is alert and oriented to person, place, and time. Mental status is at baseline.      Musculoskeletal:  Gait:  normal    Cervical Spine / Shoulder:      RIGHT  LEFT    Cervical Spine Exam  [x] All Neg    [x] All Neg     Spurling's  []  []Not tested   []  []Not tested    Moreno's  []  []Not tested   []  []Not tested    Pain with rotation  []  []Not tested   []  []Not tested    Pain with lateral bending  []  []Not tested   []  []Not tested    Paraspinal muscle tenderness  [] Paraspinal  []Midline   [] Paraspinal  []Not tested    Sensation RIGHT  LEFT    Axillary  [x] Normal []Decreased    [x] Normal []Decreased   Musculocutaneous  [x] Normal  []Decreased   [x] Normal []Decreased   Median  [x] Normal []Decreased   [x] Normal []Decreased   Radial  [x] Normal  []Decreased   [x] Normal []Decreased   Ulnar  [x] Normal  []Decreased   [x] Normal []Decreased   Scapula       Position  [x]Nml  []low  [] lateral  [x]Nml  []low  [] lateral   Dyskinesia  []+ []Abn. Shrug   []+ []Abn. Shrug                     Winging     [x]None   []Med  []Lat   []Worse w/FE  []Med  []Lat  []Worse w/FE   Scapulothoracic Compress.    []Impr Pain  []Impr Motion  []Impr Pain []Impr Motion    Range of Motion Active Passive Active Passive   Forward Elevation 170  170    Abduction 100  100    External Rotation @ side 60  60    External Rotation @ 90 abd 90  90    Internal Rotation @ 90 abd 40  40    Internal Rotation T12  T12    End range of motion  [] Pain  [] Pain  [] Pain  [] Pain   Strength RIGHT /5 LEFT /5   Abduction 5  5    External Rotation 5  5    Internal Rotation 5  5    Provocative Signs/Tests  [] All Neg   [x] +      [] -  [] All Neg   [x] +      [] -   Rotator Cuff Signs  [] All Neg  [] Not tested   [x] All Neg  [] Not tested    Neer  []  []Not tested   []  []Not tested    Jimmy Renetta  []  []Not tested   []  []Not tested    Painful arc  [x]  []Not tested   []  []Not tested    Greater tuberosity tenderness  []  []Not tested   []  []Not tested    Drop arm  []  []Not tested  []  []Not tested   Superior Escape  []  []Not tested   []  []Not tested    ER Lag  []  []Not tested   []  []Not tested    Belly press  [x]  []Not tested   []  []Not tested    Lift-off  []  []Not tested   []  []Not tested    Bear hug  []  []Not tested   []  []Not tested    Biceps/Labral Signs  [] All Neg  [] Not tested   [x] All Neg  [] Not tested    Hagen's  [x] []Not tested   []  []Not tested    Speed's  [x]  []Not tested   []  []Not tested    Dynamic Load Shift/Shear  []  []Not tested   []  []Not tested    Clicking/Popping  []  []Not tested  []  []Not tested   Bicipital groove tenderness  []  []Not tested   []  []Not tested    Edward  []  []Not tested   []  []Not tested    [facility] Joint Signs  [x] All Neg  [] Not tested   [x] All Neg  [] Not tested    [facility] joint tenderness  []  []Not tested   []  []Not tested    Cross-arm adduction pain  []  []Not tested   []  []Not tested    Instability Signs  [] All Neg  [] Not tested  [] All Neg  [] Not tested   General laxity (thumb/elbow)  []  []Not tested   []  []Not tested    Hyperabduction  []  []Not tested   []  []Not tested    Sulcus []Side   []ER    []Side   []ER       Anterior apprehension  []  []Not tested   []  []Not tested    Relocation  []   []Not tested  []  []Not tested     Further physical exam demonstrates a mild Edward sign. Ecchymosis almost completely resolved. Intact neurovascular exam.  Weakened supination strength on the right compared to the left. Imaging  Right Shoulder: [facility] AT [place]  Previous Radiographs: X-rays were reviewed that were taken previously of the right shoulder. No evidence of fractures or dislocations. There is inferior subluxation of the humeral head. MRI results humerus    Site: [employer] [facility] #: [id number] #: [id number] Procedure: MR Right Upper Arm w/o Contrast ; Reason for Exam: right shoulder pain, r/o biceps tendon tear   This document is confidential medical information.  Unauthorized disclosure or use of this information is prohibited by law. If you are not the intended recipient of this document, please advise us by calling immediately [phone].       [employer] [facility]   [address]           Patient Name: Raheel Alford   Case ID: [id number]   Patient : 1980   Referring Physician: Cm Dutton, [facility] [place] [facility]   Exam Date: 2021 Exam Description: MR Right Upper Arm w/o Contrast            HISTORY:  Right shoulder pain, evaluate for biceps tendon tear.       TECHNICAL FACTORS:  Long- and short-axis fat- and water-weighted images were performed.       COMPARISON:  None.       FINDINGS:  Complex fluid collection dissects along the anterior and medial aspect of the    proximal humerus.  Region of hematoma measuring approximately 7.1 x 2.65 x 16.4cm present.     Fluid level present within this collection typical of hematoma.  The subjacent soft tissues are    inflamed.       Fluid collection dissects between the short head and long head of the biceps.  Short head    biceps origin is torn.  It is likely completely torn.  Dedicated imaging at the level of the    shoulder would be confirmatory.  Muscle and myofascial injury involves the short head and less    so long head biceps muscles.       No pectoralis tendon tear.       CONCLUSION:   1. Large complex fluid collection or hematoma measuring 7.1 x 2.65 x 16.4cm.  Anterior proximal    humerus dissects between the short head and long head biceps muscles.  Muscle and myofascial    injury present.  Short head biceps tendon likely torn from the coracoid.  Dedicated shoulder    MRI recommended to confirm. 2. Diffuse circumferential swelling dissects down the arm.         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 Cannot visualize coracoid process in the current humerus MRI. MRI shoulder    [facility] [place] #: [id number] #: [id number] Procedure: MR Right Shoulder joint w/o Contrast ; Reason for Exam: rotator cuff and biceps tendon tear, strain of right rotator cuff capsule, and strain of right biceps. This document is confidential medical information.  Unauthorized disclosure or use of this information is prohibited by law. If you are not the intended recipient of this document, please advise us glenoid labrum, without    displaced fragment or paralabral pseudocyst formation.       No quadrilateral or suprascapular space lesion.       Mild hypertrophy of the acromioclavicular joint, with subtle reactive osteoedema of the distal    clavicle, but no active capsulitis.  No acute AC joint separation, or injury of the    acromioclavicular and coracoclavicular ligaments.       No joint effusion or intra-articular bodies.       CONCLUSION:   1. High-grade partial-thickness tear of the short head biceps proximal myotendinous junction at    the proximal myotendinous junction, located approximately 3-3.5 cm distal to the coracoid    process with prominent hematoma formation, correlating with hematoma appearance on prior upper    arm MR study.  The proximal short head biceps tendon origin at the coronoid process is intact. 2. Low-grade strain of the long head biceps tendon at the proximal myotendinous junction,    without full or partial-thickness tear.  The proximal long head vertical and intra-articular    tendon segments are intact. 3. Adjacent strain and partial-thickness tear of the proximal coracobrachialis muscle fibers. 4. 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 5. No acute rotator cuff tear, injury, or active tendinopathy. Midsubstance tear of the conjoined tendon of the right shoulder. Intact neurovascular bundle structurally. Assessment and Plan  Salomón Saldana was seen today for results. Diagnoses and all orders for this visit:    Hematoma    Rupture of right biceps tendon, initial encounter    Bicipital tendinitis of shoulder, right    Strain of right rotator cuff capsule, initial encounter    Patient is suffering from a high-grade partial thickness tear of the short head of the biceps. Have recommended conservative care. No surgery is necessary at this time.      I discussed with Abby Richard that his history, symptoms, signs and imaging are most

## 2021-08-26 ENCOUNTER — TELEPHONE (OUTPATIENT)
Dept: ORTHOPEDIC SURGERY | Age: 41
End: 2021-08-26

## 2021-08-26 NOTE — TELEPHONE ENCOUNTER
LEFT MESSAGE FOR PATIENT TO CALL AND R/S APPOINTMENT FOR 10/7/21.  PROVIDER WILL BE OUT OF THE OFFICE

## 2021-10-07 ENCOUNTER — OFFICE VISIT (OUTPATIENT)
Dept: ORTHOPEDIC SURGERY | Age: 41
End: 2021-10-07
Payer: COMMERCIAL

## 2021-10-07 VITALS — WEIGHT: 168 LBS | BODY MASS INDEX: 24.05 KG/M2 | HEIGHT: 70 IN

## 2021-10-07 DIAGNOSIS — S46.011A STRAIN OF RIGHT ROTATOR CUFF CAPSULE, INITIAL ENCOUNTER: ICD-10-CM

## 2021-10-07 DIAGNOSIS — M25.511 RIGHT SHOULDER PAIN, UNSPECIFIED CHRONICITY: ICD-10-CM

## 2021-10-07 DIAGNOSIS — S46.299A: ICD-10-CM

## 2021-10-07 DIAGNOSIS — S46.211A RUPTURE OF RIGHT BICEPS TENDON, INITIAL ENCOUNTER: ICD-10-CM

## 2021-10-07 DIAGNOSIS — T14.8XXA HEMATOMA: Primary | ICD-10-CM

## 2021-10-07 PROCEDURE — 99213 OFFICE O/P EST LOW 20 MIN: CPT | Performed by: PHYSICIAN ASSISTANT

## 2021-10-07 NOTE — PROGRESS NOTES
Dr Hilda Richard      Date /Time 10/7/2021             10:53 AM EDT  Name Edmar Barrios             1980   Location  [facility]  MRN [id number]                Chief Complaint   Patient presents with    Shoulder Pain     CK RIGHT SHOULDER        History of Present Illness    Edmar Barrios is a 38 y.o. male who presents with  right Shoulder pain. Patient presents to the office today for follow-up visit. Patient being treated for a partial tear of the short head of the bicep. Patient is doing fairly well. He has regained majority of his range of motion and strength. He has started doing 15 pound curls at home and 25 push-ups in a row without difficulties. He does lack some supination strength and shoulder range of motion    Previous history: Patient presents the office today for follow-up visit. Patient is here for MRI results involving his right shoulder. He states he continues to improve on a nearly daily basis. He is having less pain and swelling. He denies any neurologic symptoms. Previous history: Patient presents to the office today for a new problem to me. He previously seen by physician assistant Rosaline Murphy. Patient did have a significant injury knee boarding. He is here to review MRI results of the humerus. Historically from the first day of injury to now things have changed dramatically. His swelling has improved. He does notice a deformity of his bicep. He denies any neurologic symptoms. He does have increased pain with range of motion of the shoulder. Previous history: presents today for follow-up visit. Patient is here concerning his right shoulder. Approximately 12 days ago he suffered the below injury. We saw him last time and ordered him an MRI. The MRI has not yet been approved. He states that his swelling has improved but continues to have a significant amount of ecchymosis. He does have difficulties with overhead activities.   He has noticed the deformity throughout his bicep. Previous history: Patient presents to the office today for a new problem. Patient is here with a chief complaint of right shoulder pain. Patient's right shoulder became painful approximately 5 days ago. He was knee boarding and got tangled up in the rope. It wrapped around his arm and into his wrist.  He did have x-rays taken in [place] but does not have them with him. Patient was informed there was no fracture. His pain is more concentrated over the mid humerus. He does complain of a significant amount of ecchymosis. His symptoms are improving with conservative care of ibuprofen and rest.  He does have a past medical history significant for bicipital tendinitis and shoulder impingement. This was treated successfully with a cortisone injection and physical therapy. Past Medical History  No past medical history on file. No past surgical history on file. Social History     Tobacco Use    Smoking status: Never Smoker    Smokeless tobacco: Never Used   Substance Use Topics    Alcohol use: Not on file      No current outpatient medications on file prior to visit. No current facility-administered medications on file prior to visit. ASCVD 10-YEAR RISK SCORE  The ASCVD Risk score (Darlene Joshi., et al., 2013) failed to calculate for the following reasons: The systolic blood pressure is missing    Cannot find a previous HDL lab    Cannot find a previous total cholesterol lab     Review of Systems  10-point ROS is negative other than HPI. Physical Exam  Based off 1997 Exam Criteria  There were no vitals taken for this visit. Constitutional:       General: He is not in acute distress. Appearance: Normal appearance. Cardiovascular:      Rate and Rhythm: Normal rate and regular rhythm. Pulses: Normal pulses. Pulmonary:      Effort: Pulmonary effort is normal. No respiratory distress.    Neurological:      Mental Status: He is alert and oriented to person, place, and time. Mental status is at baseline. Musculoskeletal:  Gait:  normal    Cervical Spine / Shoulder:      RIGHT  LEFT    Cervical Spine Exam  [x] All Neg    [x] All Neg     Spurling's  []  []Not tested   []  []Not tested    Moreno's  []  []Not tested   []  []Not tested    Pain with rotation  []  []Not tested   []  []Not tested    Pain with lateral bending  []  []Not tested   []  []Not tested    Paraspinal muscle tenderness  [] Paraspinal  []Midline   [] Paraspinal  []Not tested    Sensation RIGHT  LEFT    Axillary  [x] Normal []Decreased    [x] Normal []Decreased   Musculocutaneous  [x] Normal  []Decreased   [x] Normal []Decreased   Median  [x] Normal []Decreased   [x] Normal []Decreased   Radial  [x] Normal  []Decreased   [x] Normal []Decreased   Ulnar  [x] Normal  []Decreased   [x] Normal []Decreased   Scapula       Position  [x]Nml  []low  [] lateral  [x]Nml  []low  [] lateral   Dyskinesia  []+ []Abn. Shrug   []+ []Abn. Shrug                     Winging     [x]None   []Med  []Lat   []Worse w/FE  []Med  []Lat  []Worse w/FE   Scapulothoracic Compress.    []Impr Pain  []Impr Motion  []Impr Pain []Impr Motion    Range of Motion Active Passive Active Passive   Forward Elevation 170  170    Abduction 80  100    External Rotation @ side 50  60    External Rotation @ 90 abd 80  90    Internal Rotation @ 90 abd 40  40    Internal Rotation T12  T12    End range of motion  [] Pain  [] Pain  [] Pain  [] Pain   Strength RIGHT /5 LEFT /5   Abduction 5  5    External Rotation 5  5    Internal Rotation 5  5    Provocative Signs/Tests  [] All Neg   [x] +      [] -  [] All Neg   [x] +      [] -   Rotator Cuff Signs  [x] All Neg  [] Not tested   [x] All Neg  [] Not tested    Neer  []  []Not tested   []  []Not tested    Mely Sullivan  []  []Not tested   []  []Not tested    Painful arc  []  []Not tested   []  []Not tested    Greater tuberosity tenderness  []  []Not tested   []  []Not tested    Drop arm  [] []Not tested  []  []Not tested   Superior Escape  []  []Not tested   []  []Not tested    ER Lag  []  []Not tested   []  []Not tested    Belly press  [x]  []Not tested   []  []Not tested    Lift-off  []  []Not tested   []  []Not tested    Bear hug  []  []Not tested   []  []Not tested    Biceps/Labral Signs  [] All Neg  [] Not tested   [x] All Neg  [] Not tested    Hagen's  [x]  []Not tested   []  []Not tested    Speed's  [x]  []Not tested   []  []Not tested    Dynamic Load Shift/Shear  []  []Not tested   []  []Not tested    Clicking/Popping  []  []Not tested  []  []Not tested   Bicipital groove tenderness  []  []Not tested   []  []Not tested    Edward  []  []Not tested   []  []Not tested    [facility] Joint Signs  [x] All Neg  [] Not tested   [x] All Neg  [] Not tested    [facility] joint tenderness  []  []Not tested   []  []Not tested    Cross-arm adduction pain  []  []Not tested   []  []Not tested    Instability Signs  [] All Neg  [] Not tested  [] All Neg  [] Not tested   General laxity (thumb/elbow)  []  []Not tested   []  []Not tested    Hyperabduction  []  []Not tested   []  []Not tested    Sulcus []Side   []ER    []Side   []ER       Anterior apprehension  []  []Not tested   []  []Not tested    Relocation  []   []Not tested  []  []Not tested     Further physical exam demonstrates a mild Edward sign. Ecchymosis completely resolved. Intact neurovascular exam.  Weakened supination strength on the right compared to the left. Imaging      MRI results humerus    Site: [facility] [facility] #: [id number] #: [id number] Procedure: MR Right Upper Arm w/o Contrast ; Reason for Exam: right shoulder pain, r/o biceps tendon tear   This document is confidential medical information.  Unauthorized disclosure or use of this information is prohibited by law. If you are not the intended recipient of this document, please advise us by calling immediately [phone].       [employer] [facility] [place]      Unauthorized disclosure or use of this information is prohibited by law. If you are not the intended recipient of this document, please advise us by calling immediately [phone].       [employer] [facility]           Patient Name: Shawanda Puentes   Case ID: [id number]   Patient : 1980   Referring Physician: Fab Alberto, [facility]   Exam Date: 2021   Exam Description: MR Right Shoulder joint w/o Contrast            HISTORY: 70-year-old male with right arm and biceps pain and swelling, ongoing after a water    skiing accident two weeks ago when a rope wrapped around the arm.  No history of surgery.     Evaluate for rotator cuff or biceps tendon tear or strain.       TECHNICAL FACTORS:  Long- and short-axis fat- and water-weighted images were performed.       COMPARISON:  MRI of the right upper arm dated 2021.       FINDINGS:         Rotator Cuff: The rotator cuff complex, including the supraspinatus, infraspinatus,    subscapularis, and teres minor tendons, is intact, without tear, injury, or tendinopathy.       Low-grade strain of the long head biceps tendon at the proximal myotendinous junction, without    full- or partial-thickness tear.  High-grade partial-thickness tear of the proximal short head    biceps myotendinous junction, located 3-3.5 cm distal to the coracoid process, with hematoma    formation measuring approximately 2.4 x 2.4 cm in diameter (ML x AP), and at least 8.5 cm in    craniocaudal length; this correlates with area of hematoma on prior upper arm MR study.  The    proximal short head biceps tendon origin at the coronoid process is intact.       Adjacent strain and partial-thickness tear of the proximal coracobrachialis muscle fibers.  The    remaining muscle and tendon structures are intact.       No acute fracture or evidence of glenohumeral dislocation injury.  The humeral head is centered    within the glenoid.       No high-grade chondromalacia or penetrating erosions of the glenohumeral articular surfaces.       Chronic, noninflamed SLAP 2B tear of the superior and posterosuperior glenoid labrum, without    displaced fragment or paralabral pseudocyst formation.       No quadrilateral or suprascapular space lesion.       Mild hypertrophy of the acromioclavicular joint, with subtle reactive osteoedema of the distal    clavicle, but no active capsulitis.  No acute AC joint separation, or injury of the    acromioclavicular and coracoclavicular ligaments.       No joint effusion or intra-articular bodies.       CONCLUSION:   1. High-grade partial-thickness tear of the short head biceps proximal myotendinous junction at    the proximal myotendinous junction, located approximately 3-3.5 cm distal to the coracoid    process with prominent hematoma formation, correlating with hematoma appearance on prior upper    arm MR study.  The proximal short head biceps tendon origin at the coronoid process is intact. 2. Low-grade strain of the long head biceps tendon at the proximal myotendinous junction,    without full or partial-thickness tear.  The proximal long head vertical and intra-articular    tendon segments are intact. 3. Adjacent strain and partial-thickness tear of the proximal coracobrachialis muscle fibers. 4. 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 5. No acute rotator cuff tear, injury, or active tendinopathy. Midsubstance tear of the conjoined tendon of the right shoulder. Intact neurovascular bundle structurally. Assessment and Plan  Diagnoses and all orders for this visit:    Hematoma    Right shoulder pain, unspecified chronicity    Rupture of right biceps tendon, initial encounter    Strain of right rotator cuff capsule, initial encounter         Patient is suffering from a high-grade partial thickness tear of the short head of the biceps. Have recommended conservative care.   No surgery is

## 2021-10-08 ENCOUNTER — HOSPITAL ENCOUNTER (OUTPATIENT)
Dept: PHYSICAL THERAPY | Age: 41
Setting detail: THERAPIES SERIES
Discharge: HOME | End: 2021-10-08
Payer: COMMERCIAL

## 2021-10-08 PROCEDURE — 97110 THERAPEUTIC EXERCISES: CPT | Performed by: PHYSICAL THERAPIST

## 2021-10-08 PROCEDURE — 97161 PT EVAL LOW COMPLEX 20 MIN: CPT | Performed by: PHYSICAL THERAPIST

## 2021-10-08 NOTE — PLAN OF CARE
[facility], [address], [address] [place] [address]  Phone: [phone]  Fax [phone]     Physical Therapy Certification    Dear Referring Practitioner: Chad Dougherty PA-C,    We had the pleasure of evaluating the following patient for physical therapy services at [address] [place] [address]. A summary of our findings can be found in the initial assessment below. This includes our plan of care. If you have any questions or concerns regarding these findings, please do not hesitate to contact me at the office phone number checked above. Thank you for the referral.       Physician Signature:_______________________________Date:__________________  By signing above (or electronic signature), therapists plan is approved by physician    Patient: Ruy Pedraza   : 1980   MRN: [id number]  Referring Physician: Referring Practitioner: Chad Dougherty PA-C      Evaluation Date: 10/8/2021      Medical Diagnosis Information:  Diagnosis: E45.739S - Rupture of right biceps tendon, M25.511 - right shoulder pain,  S46.011 - right RTC strain   Treatment Diagnosis: M25.511 - right shoulder pain                                         Insurance information: PT Insurance Information: [employer]-  2800 deductible   80/20  60 PT,   no auth    Precautions/ Contra-indications: n/a  C-SSRS Triggered by Intake questionnaire (Past 2 wk assessment):   [x] No, Questionnaire did not trigger screening.   [] Yes, Patient intake triggered further evaluation      [] C-SSRS Screening completed  [] PCP notified via Plan of Care  [] Emergency services notified     Latex Allergy:  [x]NO      []YES  Preferred Language for Healthcare:   [x]English       []other:    SUBJECTIVE: Patient stated complaint: rope wrapped around right arm while knee boarding/ skiing. Pt tore biceps. He was in sling for one day, and he just rested arm. Pt has been taken ibuprofen.     Pt has no c/o N/T   Upper arm feels stiff and sore. Does not wake him from sleep. Pt has difficulty shutting door behind back. Hard to lift overhead,   Would like to return to weight lifting. Pt has been able to do push ups. He is able to curl 15# lbs weight. Relevant Medical History: right meniscus  2011  Functional Disability Index: Quick Dash 11%    Pain Scale: 0-2/10  Easing factors: ibuprofen, rest  Provocative factors: overhead, reaching, lifting. Type: []Constant   []Intermittent  []Radiating []Localized []other:     Numbness/Tingling: n/a    Occupation/School:     Living Status/Prior Level of Function: Independent with ADLs and IADLs, soccer, water skiing, weight lifting. OBJECTIVE:     ROM Left Right   Shoulder Flex  170   Shoulder Abd     Shoulder ER  90   Shoulder IR  40   Elbow flex  140   Elbow ext  0   supination  65   Pronation. 75   Strength  Left Right   Shoulder Flex 5 5   Shoulder Scap 5 5   Shoulder ER 5 5   Shoulder IR 5 4+   Elbow flex 5 5   Elbow ext 5 4+   supination 5 4   pronation 5 5        Quick Dash  16= 11%   Pain scale. 0-2 / 10     Reflexes/Sensation:    [x]Dermatomes/Myotomes intact    []Reflexes equal and normal bilaterally   []Other:    Joint mobility:    [x]Normal    []Hypo   []Hyper    Palpation:  Tender along anterior shoulders     Functional Mobility/Transfers:     Posture: good alignment    Bandages/Dressings/Incisions: n/a    Gait: (include devices/WB status): WNL    Orthopedic Special Tests: + speed's  - Neer's  - thong's timothy                        [x] Patient history, allergies, meds reviewed. Medical chart reviewed. See intake form. Review Of Systems (ROS):  [x]Performed Review of systems (Integumentary, CardioPulmonary, Neurological) by intake and observation. Intake form has been scanned into medical record.  Patient has been instructed to contact their primary care physician regarding ROS issues if not already being addressed at this time.      Co-morbidities/Complexities (which will affect course of rehabilitation):   [x]None           Arthritic conditions   []Rheumatoid arthritis (M05.9)  []Osteoarthritis (M19.91)   Cardiovascular conditions   []Hypertension (I10)  []Hyperlipidemia (E78.5)  []Angina pectoris (I20)  []Atherosclerosis (I70)   Musculoskeletal conditions   []Disc pathology   []Congenital spine pathologies   []Prior surgical intervention  []Osteoporosis (M81.8)  []Osteopenia (M85.8)   Endocrine conditions   []Hypothyroid (E03.9)  []Hyperthyroid Gastrointestinal conditions   []Constipation (B83.89)   Metabolic conditions   []Morbid obesity (E66.01)  []Diabetes type 1(E10.65) or 2 (E11.65)   []Neuropathy (G60.9)     Pulmonary conditions   []Asthma (J45)  []Coughing   []COPD (J44.9)   Psychological Disorders  []Anxiety (F41.9)  []Depression (F32.9)   []Other:   []Other:          Barriers to/and or personal factors that will affect rehab potential:              []Age  []Sex              []Motivation/Lack of Motivation                        []Co-Morbidities              []Cognitive Function, education/learning barriers              []Environmental, home barriers              []profession/work barriers  []past PT/medical experience  []other:  Justification: should progress well     Falls Risk Assessment (30 days):   [x] Falls Risk assessed and no intervention required.   [] Falls Risk assessed and Patient requires intervention due to being higher risk   TUG score (>12s at risk):     [] Falls education provided, including       G-Codes:   Quick Dash  11%    ASSESSMENT:   Functional Impairments   []Noted spinal or UE joint hypomobility   []Noted spinal or UE joint hypermobility   []Decreased UE functional ROM   [x]Decreased UE functional strength   []Abnormal reflexes/sensation/myotomal/dermatomal deficits   [x]Decreased RC/scapular/core strength and neuromuscular control   []other:      Functional Activity Limitations (from functional comorbidities that impact the plan of care;  []1-2 personal factors and/or comorbidities that impact the plan of care  []3 personal factors and/or comorbidities that impact the plan of care  [x] An examination of body systems using standardized tests and measures addressing any of the following: body structures and functions (impairments), activity limitations, and/or participation restrictions;:  [x] a total of 1-2 or more elements   [] a total of 3 or more elements   [] a total of 4 or more elements   [x] A clinical presentation with:  [x] stable and/or uncomplicated characteristics   [] evolving clinical presentation with changing characteristics  [] unstable and unpredictable characteristics;   [x] Clinical decision making of [x] low, [] moderate, [] high complexity using standardized patient assessment instrument and/or measurable assessment of functional outcome. [x] EVAL (LOW) 72856 (typically 20 minutes face-to-face)  [] EVAL (MOD) 83469 (typically 30 minutes face-to-face)  [] EVAL (HIGH) 12989 (typically 45 minutes face-to-face)  [] RE-EVAL       PLAN:  Frequency/Duration:  1 days per week for 4 Weeks:  INTERVENTIONS:  [x] Therapeutic exercise including: strength training, ROM, for Upper extremity and core   []  NMR activation and proprioception for UE, scap and Core   [x] Manual therapy as indicated for shoulder, scapula and spine to include: Dry Needling/IASTM, STM, PROM, Gr I-IV mobilizations, manipulation. [] Modalities as needed that may include: thermal agents, E-stim, Biofeedback, US, iontophoresis as indicated  [x] Patient education on joint protection, postural re-education, activity modification, progression of HEP. HEP instruction: [id number]    GOALS:  Patient stated goal: Get ready for weight lifting  [] Progressing: [] Met: [] Not Met: [] Adjusted    Therapist goals for Patient:   Short Term Goals: To be achieved in: 2 weeks  1.  Independent in HEP and progression per patient tolerance, in order to prevent re-injury. [] Progressing: [] Met: [] Not Met: [] Adjusted  2. Patient will have a decrease in pain to facilitate improvement in movement, function, and ADLs as indicated by Functional Deficits. [] Progressing: [] Met: [] Not Met: [] Adjusted    Long Term Goals: To be achieved in: 4 weeks  1. Disability index score of 0% or less for the [facility] to assist with reaching prior level of function. [] Progressing: [] Met: [] Not Met: [] Adjusted  2. Patient will demonstrate increased AROM to 180 flex, abd, 90 Er and 70 IR to allow for proper joint functioning as indicated by patients Functional Deficits. [] Progressing: [] Met: [] Not Met: [] Adjusted  3. Patient will demonstrate an increase in Strength to 5/5 throughout right UE to allow for proper functional mobility as indicated by patients Functional Deficits. [] Progressing: [] Met: [] Not Met: [] Adjusted  4. Patient will return to reaching into backseat and closing door behind him without increased symptoms or restriction. [] Progressing: [] Met: [] Not Met: [] Adjusted  5.  Return to gym program.  [] Progressing: [] Met: [] Not Met: [] Adjusted       Electronically signed by:  Marychuy Dowd, PT

## 2021-10-08 NOTE — FLOWSHEET NOTE
[facility] [facility],  [address] [place]  Phone: [phone]  Fax [phone]        Date:  10/8/2021    Patient Name:  Juana Lay    :  1980  MRN: [id number]  Restrictions/Precautions:    Medical/Treatment Diagnosis Information:  · Diagnosis: S46.211D - Rupture of right biceps tendon, M25.511 - right shoulder pain,  S46.011 - right RTC strain  · Treatment Diagnosis: M25.511 - right shoulder pain  Insurance/Certification information:  PT Insurance Information: [employer]-  2800 deductible   80/20  60 PT,   no auth  Physician Information:  Referring Practitioner: Radha Crawford PA-C  Has the plan of care been signed (Y/N):        []  Yes  [x]  No     Date of Patient follow up with Physician: nsv      Is this a Progress Report:     []  Yes  [x]  No        If Yes:  Date Range for reporting period:  Beginning: 10/8/21  Ending:     Progress report will be due (10 Rx or 30 days whichever is less):        Recertification will be due (POC Duration  / 90 days whichever is less):       Visit # Insurance Allowable Auth Required   In-person 1 60 []  Yes [x]  No    Telehealth   []  Yes []  No    Total          Therapy Diagnosis/Practice Pattern:e      Number of Comorbidities:  [x]0     []1-2    []3+    Latex Allergy:  [x]NO      []YES  Preferred Language for Healthcare:   [x]English       []other:      SUBJECTIVE:  See eval    OBJECTIVE: See eval   Observation:    Test measurements:      RESTRICTIONS/PRECAUTIONS: N/A    Exercises/Interventions:     Therapeutic Ex (24328) Sets/sec Reps Notes/CUES         Serratus punch 6# 2 x 10 hep   SL ERC 3# 2 x 10  hep   Prone ext/  Prone horiz abd 3# 2x 10 hrep   Corner s :20 3x hep   Biceps s at table and wall :10 3x hep   Chest/ biceps s behind back :10 3x hep   Therabar supination gr X 10 2 exercises   TB supination GR 2 x 10 hep   biceps curl with sup 5# X 10                 Manual Intervention (54539)                                          NMR re-education (09027)   CUES NEEDED                                                         Therapeutic Activity (22763)                                            Access Code: [email or web address]  URL: [employer]/  Date: 07/47/0800  Prepared by: Yani Singleton    Exercises  Supine Scapular Protraction in Flexion with Dumbbells - 1 x daily - 7 x weekly - 2 sets - 10 reps  Sidelying Shoulder ER with Towel and Dumbbell - 1 x daily - 7 x weekly - 2 sets - 10 reps  Prone Shoulder Extension - Single Arm with Dumbbell - 1 x daily - 7 x weekly - 2 sets - 10 reps  Prone Single Arm Shoulder Horizontal Abduction with Dumbbell - Palm Down - 1 x daily - 7 x weekly - 2 sets - 10 reps  Doorway Pec Stretch at 90 Degrees Abduction - 1 x daily - 7 x weekly - 1 sets - 3 reps - 20 hold  Chest and Bicep Stretch - Arms Behind Back - 1 x daily - 7 x weekly - 1 sets - 3 reps - 20 hold  Bicep Stretch at Table - 1 x daily - 7 x weekly - 1 sets - 3 reps - 20 hold  Standing Bicep Stretch at Wall - 1 x daily - 7 x weekly - 1 sets - 3 reps - 20 hold  Seated Alternating Bicep Curls Supinated with Dumbbells - 1 x daily - 7 x weekly - 2 sets - 10 reps  Forearm Pronation and Supination with Hammer - 1 x daily - 7 x weekly - 2 sets - 10 reps  Forearm Supination with Resistance - 1 x daily - 7 x weekly - 2 sets - 10 reps  Forearm Supination with Resistance Bar - 1 x daily - 7 x weekly - 2 sets - 10 reps  Forearm Supination with Resistance Bar - 1 x daily - 7 x weekly - 2 sets - 10 reps    Therapeutic Exercise and NMR EXR  [x] (26274) Provided verbal/tactile cueing for activities related to strengthening, flexibility, endurance, ROM  for improvements in scapular, scapulothoracic and UE control with self care, reaching, carrying, lifting, house/yardwork, driving/computer work.    [] (24068) Provided verbal/tactile cueing for activities related to improving balance, coordination, kinesthetic sense, posture, motor skill, proprioception  to assist with  scapular, scapulothoracic and UE control with self care, reaching, carrying, lifting, house/yardwork, driving/computer work. Therapeutic Activities:    [] (03815 or 48886) Provided verbal/tactile cueing for activities related to improving balance, coordination, kinesthetic sense, posture, motor skill, proprioception and motor activation to allow for proper function of scapular, scapulothoracic and UE control with self care, carrying, lifting, driving/computer work.      Home Exercise Program:    [x] (14753) Reviewed/Progressed HEP activities related to strengthening, flexibility, endurance, ROM of scapular, scapulothoracic and UE control with self care, reaching, carrying, lifting, house/yardwork, driving/computer work  [] (84758) Reviewed/Progressed HEP activities related to improving balance, coordination, kinesthetic sense, posture, motor skill, proprioception of scapular, scapulothoracic and UE control with self care, reaching, carrying, lifting, house/yardwork, driving/computer work      Manual Treatments:  PROM / STM / Oscillations-Mobs:  G-I, II, III, IV (PA's, Inf., Post.)  [] (81894) Provided manual therapy to mobilize soft tissue/joints of cervical/CT, scapular GHJ and UE for the purpose of modulating pain, promoting relaxation,  increasing ROM, reducing/eliminating soft tissue swelling/inflammation/restriction, improving soft tissue extensibility and allowing for proper ROM for normal function with self care, reaching, carrying, lifting, house/yardwork, driving/computer work    Modalities:      Charges  Timed Code Treatment Minutes: 20   Total Treatment Minutes: 45     [x] EVAL (LOW) 91436   [] EVAL (MOD) 34440   [] EVAL (HIGH) 99314   [] RE-EVAL     [x] LB(61879) x   1  [] IONTO  [] NMR (99907) x     [] VASO  [] Manual (54764) x      [] Other:  [] TA x      [] Mech Traction (64268)  [] ES(attended) (94912)      [] ES (un) (43424): GOALS:  Patient stated goal: Get ready for weight lifting  [] Progressing: [] Met: [] Not Met: [] Adjusted    Therapist goals for Patient:   Short Term Goals: To be achieved in: 2 weeks  1. Independent in HEP and progression per patient tolerance, in order to prevent re-injury. [] Progressing: [] Met: [] Not Met: [] Adjusted  2. Patient will have a decrease in pain to facilitate improvement in movement, function, and ADLs as indicated by Functional Deficits. [] Progressing: [] Met: [] Not Met: [] Adjusted    Long Term Goals: To be achieved in: 4 weeks  1. Disability index score of 0% or less for the [facility] to assist with reaching prior level of function. [] Progressing: [] Met: [] Not Met: [] Adjusted  2. Patient will demonstrate increased AROM to 180 flex, abd, 90 Er and 70 IR to allow for proper joint functioning as indicated by patients Functional Deficits. [] Progressing: [] Met: [] Not Met: [] Adjusted  3. Patient will demonstrate an increase in Strength to 5/5 throughout right UE to allow for proper functional mobility as indicated by patients Functional Deficits. [] Progressing: [] Met: [] Not Met: [] Adjusted  4. Patient will return to reaching into backseat and closing door behind him without increased symptoms or restriction. [] Progressing: [] Met: [] Not Met: [] Adjusted  5. Return to gym program.  [] Progressing: [] Met: [] Not Met: [] Adjusted     Progression Towards Functional goals:  [] Patient is progressing as expected towards functional goals listed. [] Progression is slowed due to complexities listed. [] Progression has been slowed due to co-morbidities. [x] Plan just implemented, too soon to assess goals progression  [] Other:     ASSESSMENT:  See eval    Overall Progression Towards Functional goals/ Treatment Progress Update:  [] Patient is progressing as expected towards functional goals listed. [] Progression is slowed due to complexities/Impairments listed.   [] Progression has been slowed due to co-morbidities. [x] Plan just implemented, too soon to assess goals progression <30days   [] Goals require adjustment due to lack of progress  [] Patient is not progressing as expected and requires additional follow up with physician  [] Other    Prognosis for POC: [x] Good [] Fair  [] Poor      Patient requires continued skilled intervention: [x] Yes  [] No    Treatment/Activity Tolerance:  [x] Patient able to complete treatment  [] Patient limited by fatigue  [] Patient limited by pain    [] Patient limited by other medical complications  [] Other:        PLAN: See eval  [] Continue per plan of care [] Alter current plan (see comments above)  [x] Plan of care initiated [] Hold pending MD visit [] Discharge      Electronically signed by:  Chris Kaminski PT    Note: If patient does not return for scheduled/ recommended follow up visits, this note will serve as a discharge from care along with most recent update on progress.

## 2022-02-28 ENCOUNTER — TELEPHONE (OUTPATIENT)
Dept: INTERNAL MEDICINE CLINIC | Age: 42
End: 2022-02-28

## 2022-02-28 NOTE — TELEPHONE ENCOUNTER
----- Message from Yennifer Thayer sent at 2/25/2022  2:55 PM EST -----  Subject: Results Request    QUESTIONS  Which lab or imaging result is the patient calling about? Colonoscopy and   Test Results   Which provider ordered the test?   At what location was the test performed? Date the test was performed? 2022-02-10  Additional Information for Provider? patient received some testing as well   as a colonoscopy and would like to verify results where sent to office. ---------------------------------------------------------------------------  --------------  Arelye Arrow INFO  What is the best way for the office to contact you? OK to leave message on   voicemail  Preferred Call Back Phone Number?  [phone]

## 2022-03-07 RX ORDER — FLUTICASONE PROPIONATE 50 MCG
2 SPRAY, SUSPENSION (ML) NASAL
COMMUNITY
End: 2022-03-10

## 2022-03-07 RX ORDER — LORATADINE 10 MG/1
CAPSULE, LIQUID FILLED ORAL
COMMUNITY
End: 2022-03-10
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
## 2022-03-10 ENCOUNTER — HOSPITAL ENCOUNTER (OUTPATIENT)
Age: 42
Discharge: HOME | End: 2022-03-10
Payer: COMMERCIAL

## 2022-03-10 ENCOUNTER — OFFICE VISIT (OUTPATIENT)
Dept: PRIMARY CARE CLINIC | Age: 42
End: 2022-03-10
Payer: COMMERCIAL

## 2022-03-10 VITALS
HEIGHT: 70 IN | RESPIRATION RATE: 18 BRPM | DIASTOLIC BLOOD PRESSURE: 70 MMHG | TEMPERATURE: 97.4 F | WEIGHT: 179.2 LBS | OXYGEN SATURATION: 98 % | BODY MASS INDEX: 25.65 KG/M2 | HEART RATE: 93 BPM | SYSTOLIC BLOOD PRESSURE: 116 MMHG

## 2022-03-10 DIAGNOSIS — E66.3 OVERWEIGHT (BMI 25.0-29.9): ICD-10-CM

## 2022-03-10 DIAGNOSIS — Z00.00 HEALTHCARE MAINTENANCE: ICD-10-CM

## 2022-03-10 DIAGNOSIS — K21.9 GASTROESOPHAGEAL REFLUX DISEASE WITHOUT ESOPHAGITIS: ICD-10-CM

## 2022-03-10 DIAGNOSIS — Z15.09 PMS2-RELATED LYNCH SYNDROME (HNPCC4): Primary | ICD-10-CM

## 2022-03-10 DIAGNOSIS — S46.211D TEAR OF RIGHT BICEPS MUSCLE, SUBSEQUENT ENCOUNTER: ICD-10-CM

## 2022-03-10 LAB
ANION GAP SERPL CALCULATED.3IONS-SCNC: 13 MMOL/L (ref 3–16)
BUN BLDV-MCNC: 13 MG/DL (ref 7–20)
CALCIUM SERPL-MCNC: 9.2 MG/DL (ref 8.3–10.6)
CHLORIDE BLD-SCNC: 100 MMOL/L (ref 99–110)
CHOLESTEROL, TOTAL: 279 MG/DL (ref 0–199)
CO2: 26 MMOL/L (ref 21–32)
CREAT SERPL-MCNC: 1.2 MG/DL (ref 0.9–1.3)
GFR AFRICAN AMERICAN: >60
GFR NON-AFRICAN AMERICAN: >60
GLUCOSE BLD-MCNC: 83 MG/DL (ref 70–99)
HDLC SERPL-MCNC: 54 MG/DL (ref 40–60)
LDL CHOLESTEROL CALCULATED: 180 MG/DL
POTASSIUM SERPL-SCNC: 4.1 MMOL/L (ref 3.5–5.1)
SODIUM BLD-SCNC: 139 MMOL/L (ref 136–145)
TRIGL SERPL-MCNC: 225 MG/DL (ref 0–150)
VLDLC SERPL CALC-MCNC: 45 MG/DL

## 2022-03-10 PROCEDURE — 83036 HEMOGLOBIN GLYCOSYLATED A1C: CPT

## 2022-03-10 PROCEDURE — 99204 OFFICE O/P NEW MOD 45 MIN: CPT

## 2022-03-10 PROCEDURE — 86702 HIV-2 ANTIBODY: CPT

## 2022-03-10 PROCEDURE — 36415 COLL VENOUS BLD VENIPUNCTURE: CPT

## 2022-03-10 PROCEDURE — 80048 BASIC METABOLIC PNL TOTAL CA: CPT

## 2022-03-10 PROCEDURE — 86803 HEPATITIS C AB TEST: CPT

## 2022-03-10 PROCEDURE — 80061 LIPID PANEL: CPT

## 2022-03-10 PROCEDURE — 86701 HIV-1ANTIBODY: CPT

## 2022-03-10 PROCEDURE — 87390 HIV-1 AG IA: CPT

## 2022-03-10 RX ORDER — PANTOPRAZOLE SODIUM 40 MG/1
40 TABLET, DELAYED RELEASE ORAL
COMMUNITY
Start: 2022-02-10

## 2022-03-10 SDOH — ECONOMIC STABILITY: TRANSPORTATION INSECURITY
IN THE PAST 12 MONTHS, HAS LACK OF TRANSPORTATION KEPT YOU FROM MEETINGS, WORK, OR FROM GETTING THINGS NEEDED FOR DAILY LIVING?: NO

## 2022-03-10 SDOH — ECONOMIC STABILITY: FOOD INSECURITY: WITHIN THE PAST 12 MONTHS, YOU WORRIED THAT YOUR FOOD WOULD RUN OUT BEFORE YOU GOT MONEY TO BUY MORE.: NEVER TRUE

## 2022-03-10 SDOH — ECONOMIC STABILITY: TRANSPORTATION INSECURITY
IN THE PAST 12 MONTHS, HAS THE LACK OF TRANSPORTATION KEPT YOU FROM MEDICAL APPOINTMENTS OR FROM GETTING MEDICATIONS?: NO

## 2022-03-10 SDOH — ECONOMIC STABILITY: FOOD INSECURITY: WITHIN THE PAST 12 MONTHS, THE FOOD YOU BOUGHT JUST DIDN'T LAST AND YOU DIDN'T HAVE MONEY TO GET MORE.: NEVER TRUE

## 2022-03-10 ASSESSMENT — PATIENT HEALTH QUESTIONNAIRE - PHQ9
2. FEELING DOWN, DEPRESSED OR HOPELESS: 0
SUM OF ALL RESPONSES TO PHQ QUESTIONS 1-9: 0
SUM OF ALL RESPONSES TO PHQ QUESTIONS 1-9: 0
SUM OF ALL RESPONSES TO PHQ9 QUESTIONS 1 & 2: 0
1. LITTLE INTEREST OR PLEASURE IN DOING THINGS: 0
SUM OF ALL RESPONSES TO PHQ QUESTIONS 1-9: 0
SUM OF ALL RESPONSES TO PHQ QUESTIONS 1-9: 0

## 2022-03-10 ASSESSMENT — ANXIETY QUESTIONNAIRES
2. NOT BEING ABLE TO STOP OR CONTROL WORRYING: 0
4. TROUBLE RELAXING: 2
3. WORRYING TOO MUCH ABOUT DIFFERENT THINGS: 0
1. FEELING NERVOUS, ANXIOUS, OR ON EDGE: 0
6. BECOMING EASILY ANNOYED OR IRRITABLE: 0
GAD7 TOTAL SCORE: 2
5. BEING SO RESTLESS THAT IT IS HARD TO SIT STILL: 0
IF YOU CHECKED OFF ANY PROBLEMS ON THIS QUESTIONNAIRE, HOW DIFFICULT HAVE THESE PROBLEMS MADE IT FOR YOU TO DO YOUR WORK, TAKE CARE OF THINGS AT HOME, OR GET ALONG WITH OTHER PEOPLE: NOT DIFFICULT AT ALL
7. FEELING AFRAID AS IF SOMETHING AWFUL MIGHT HAPPEN: 0

## 2022-03-10 ASSESSMENT — ENCOUNTER SYMPTOMS
CONSTIPATION: 0
SINUS PAIN: 0
SHORTNESS OF BREATH: 0
DIARRHEA: 0
COUGH: 0

## 2022-03-10 ASSESSMENT — SOCIAL DETERMINANTS OF HEALTH (SDOH): HOW HARD IS IT FOR YOU TO PAY FOR THE VERY BASICS LIKE FOOD, HOUSING, MEDICAL CARE, AND HEATING?: NOT HARD AT ALL

## 2022-03-10 NOTE — PATIENT INSTRUCTIONS
Patient Education        Well Visit, Ages 25 to 48: Care Instructions  Overview     Well visits can help you stay healthy. Your doctor has checked your overall health and may have suggested ways to take good care of yourself. Your doctor also may have recommended tests. At home, you can help prevent illness with healthy eating, regular exercise, and other steps. Follow-up care is a key part of your treatment and safety. Be sure to make and go to all appointments, and call your doctor if you are having problems. It's also a good idea to know your test results and keep a list of the medicines you take. How can you care for yourself at home? · Get screening tests that you and your doctor decide on. Screening helps find diseases before any symptoms appear. · Eat healthy foods. Choose fruits, vegetables, whole grains, protein, and low-fat dairy foods. Limit fat, especially saturated fat. Reduce salt in your diet. · Limit alcohol. If you are a man, have no more than 2 drinks a day or 14 drinks a week. If you are a woman, have no more than 1 drink a day or 7 drinks a week. · Get at least 30 minutes of physical activity on most days of the week. Walking is a good choice. You also may want to do other activities, such as running, swimming, cycling, or playing tennis or team sports. Discuss any changes in your exercise program with your doctor. · Reach and stay at a healthy weight. This will lower your risk for many problems, such as obesity, diabetes, heart disease, and high blood pressure. · Do not smoke or allow others to smoke around you. If you need help quitting, talk to your doctor about stop-smoking programs and medicines. These can increase your chances of quitting for good. · Care for your mental health. It is easy to get weighed down by worry and stress. Learn strategies to manage stress, like deep breathing and mindfulness, and stay connected with your family and community.  If you find you often feel sad or hopeless, talk with your doctor. Treatment can help. · Talk to your doctor about whether you have any risk factors for sexually transmitted infections (STIs). You can help prevent STIs if you wait to have sex with a new partner (or partners) until you've each been tested for STIs. It also helps if you use condoms (male or female condoms) and if you limit your sex partners to one person who only has sex with you. Vaccines are available for some STIs, such as HPV. · Use birth control if it's important to you to prevent pregnancy. Talk with your doctor about the choices available and what might be best for you. · If you think you may have a problem with alcohol or drug use, talk to your doctor. This includes prescription medicines (such as amphetamines and opioids) and illegal drugs (such as cocaine and methamphetamine). Your doctor can help you figure out what type of treatment is best for you. · Protect your skin from too much sun. When you're outdoors from 10 a.m. to 4 p.m., stay in the shade or cover up with clothing and a hat with a wide brim. Wear sunglasses that block UV rays. Even when it's cloudy, put broad-spectrum sunscreen (SPF 30 or higher) on any exposed skin. · See a dentist one or two times a year for checkups and to have your teeth cleaned. · Wear a seat belt in the car. When should you call for help? Watch closely for changes in your health, and be sure to contact your doctor if you have any problems or symptoms that concern you. Where can you learn more? Go to [email or web address] org and sign in to your [employer] account. Enter P072 in the [employer] box to learn more about \"Well Visit, Ages 25 to 48: Care Instructions. \"     If you do not have an account, please click on the \"Sign Up Now\" link. Current as of: October 6, 2021               Content Version: 13.1  © 2114-3291 [employer].    Care instructions adapted under license by [facility]. If you have questions about a medical condition or this instruction, always ask your healthcare professional. [place] [address] any warranty or liability for your use of this information.

## 2022-03-11 LAB
ESTIMATED AVERAGE GLUCOSE: 108.3 MG/DL
HBA1C MFR BLD: 5.4 %
HIV AG/AB: NORMAL
HIV ANTIGEN: NORMAL
HIV-1 ANTIBODY: NORMAL
HIV-2 AB: NORMAL

## 2022-03-12 LAB
HEPATITIS C VIRUS AB BY CIA INDEX: 0.02 IV
HEPATITIS C VIRUS AB BY CIA INTERPRETATION: NEGATIVE

## 2022-06-02 ENCOUNTER — HOSPITAL ENCOUNTER (OUTPATIENT)
Dept: PHYSICAL THERAPY | Age: 42
Setting detail: THERAPIES SERIES
Discharge: HOME | End: 2022-06-02
Payer: COMMERCIAL

## 2022-06-02 PROCEDURE — 97161 PT EVAL LOW COMPLEX 20 MIN: CPT | Performed by: PHYSICAL THERAPIST

## 2022-06-02 PROCEDURE — 97110 THERAPEUTIC EXERCISES: CPT | Performed by: PHYSICAL THERAPIST

## 2022-06-02 NOTE — PLAN OF CARE
[facility], [address]  Phone: [phone]  Fax [phone]     Anastacia Kendall    Dear Dr. Lindy Arechiga  ,    We had the pleasure of evaluating the following patient for physical therapy services at [address] [place] [address]. A summary of our findings can be found in the initial assessment below. This includes our plan of care. If you have any questions or concerns regarding these findings, please do not hesitate to contact me at the office phone number checked above. Thank you for the referral.       Physician Signature:_______________________________Date:__________________  By signing above (or electronic signature), therapists plan is approved by physician    Patient: Juan Rodriguez   : 1980   MRN: [id number]  Referring Physician:  Linda Phoenix      Evaluation Date: 2022      Medical Diagnosis Information:  Diagnosis: Right shoulder pain (M25.511, G89.29)   Treatment Diagnosis: Right shoulder pain (M25.511)                                         Insurance information: PT Insurance Information: [employer]    Precautions/ Contra-indications:   C-SSRS Triggered by Intake questionnaire (Past 2 wk assessment):   [x] No, Questionnaire did not trigger screening.   [] Yes, Patient intake triggered further evaluation      [] C-SSRS Screening completed  [] PCP notified via Plan of Care  [] Emergency services notified     Latex Allergy:  [x]NO      []YES  Preferred Language for Healthcare:   [x]English       []other:    SUBJECTIVE: Patient stated complaint: Patient reports he injured his right shoulder when he was remodeling his basement and was working on a pipe overhead and felt a sharp pain. Reports that he has had some pain and popping in his right shoulder in the past, and has a torn bicep on that side. Currently, pain located deep in the shoulder.  Denies radiating pain and N/T. Patient is RHD. Relevant Medical History:no significant PMH  Functional Disability Index:  FOTO shoulder 62/100 (62%)  Pain Scale: 0-7/10  Easing factors: rest, medication  Provocative factors: reaching or lifting above shoulder height, weightbearing on RUE, reaching behind,      Type: []Constant   [x]Intermittent  []Radiating []Localized []other:     Numbness/Tingling: denies N/T    Occupation/School:     Living Status/Prior Level of Function: Independent with ADLs and IADLs, reports that he was starting to get back into a gym routine of lifting; enjoys home remodeling    OBJECTIVE:     CERV ROM     Cervical Flexion WNL WNL   Cervical Extension WNL WNL   Cervical SB restricted restricted   Cervical rotation WNL WNL        ROM Left Right   Shoulder Flex  140   Shoulder Abd  120   Shoulder ER  T4   Shoulder IR  T10                  Strength  Left Right   Shoulder Flex  4/5   Shoulder Scap  4/5   Shoulder ER  4/5   Shoulder IR  4+/5               Reflexes/Sensation: DNT this date   []Dermatomes/Myotomes intact    []Reflexes equal and normal bilaterally   []Other:    Joint mobility:    []Normal    [x]Hypo   []Hyper    Palpation: tightness with palpation to post cuff    Functional Mobility/Transfers: independent    Posture: right shoulder elevated and forward at rest    Bandages/Dressings/Incisions: N/A    Gait: (include devices/WB status): WNL    Orthopedic Special Tests: Neer +, Barbara Hensen +, empty can +                       [x] Patient history, allergies, meds reviewed. Medical chart reviewed. See intake form. Review Of Systems (ROS):  [x]Performed Review of systems (Integumentary, CardioPulmonary, Neurological) by intake and observation. Intake form has been scanned into medical record. Patient has been instructed to contact their primary care physician regarding ROS issues if not already being addressed at this time.       Co-morbidities/Complexities (which will affect course of rehabilitation):   []None           Arthritic conditions   []Rheumatoid arthritis (M05.9)  []Osteoarthritis (M19.91)   Cardiovascular conditions   []Hypertension (I10)  []Hyperlipidemia (E78.5)  []Angina pectoris (I20)  []Atherosclerosis (I70)   Musculoskeletal conditions   []Disc pathology   []Congenital spine pathologies   []Prior surgical intervention  []Osteoporosis (M81.8)  []Osteopenia (M85.8)   Endocrine conditions   []Hypothyroid (E03.9)  []Hyperthyroid Gastrointestinal conditions   []Constipation (O66.58)   Metabolic conditions   []Morbid obesity (E66.01)  []Diabetes type 1(E10.65) or 2 (E11.65)   []Neuropathy (G60.9)     Pulmonary conditions   []Asthma (J45)  []Coughing   []COPD (J44.9)   Psychological Disorders  []Anxiety (F41.9)  []Depression (F32.9)   []Other:   [x]Other:   Hx of right bicep tear       Barriers to/and or personal factors that will affect rehab potential:              [x]Age  []Sex              []Motivation/Lack of Motivation                        [x]Co-Morbidities              []Cognitive Function, education/learning barriers              []Environmental, home barriers              []profession/work barriers  []past PT/medical experience  []other:  Justification:      Falls Risk Assessment (30 days):   [x] Falls Risk assessed and no intervention required. [] Falls Risk assessed and Patient requires intervention due to being higher risk   TUG score (>12s at risk):     [] Falls education provided, including     ASSESSMENT: Patient demonstrates decreased AROM and strength in his right shoulder, limiting his ability to complete overhead reaching and lifting activities without pain. Will benefit from skilled PT to address limitations.     Functional Impairments   [x]Noted spinal or UE joint hypomobility   []Noted spinal or UE joint hypermobility   [x]Decreased UE functional ROM   [x]Decreased UE functional strength   []Abnormal reflexes/sensation/myotomal/dermatomal deficits   [x]Decreased RC/scapular/core strength and neuromuscular control   []other:      Functional Activity Limitations (from functional questionnaire and intake)   []Reduced ability to tolerate prolonged functional positions   [x]Reduced ability or difficulty with changes of positions or transfers between positions   [x]Reduced ability to maintain good posture and demonstrate good body mechanics with sitting, bending, and lifting   [] Reduced ability or tolerance with driving and/or computer work   []Reduced ability to sleep   [x]Reduced ability to perform lifting, reaching, carrying tasks   [x]Reduced ability to tolerate impact through UE   [x]Reduced ability to reach behind back   []Reduced ability to  or hold objects   []Reduced ability to throw or toss an object   []other:    Participation Restrictions   []Reduced participation in self care activities   [x]Reduced participation in home management activities   []Reduced participation in work activities   [x]Reduced participation in social activities. [x]Reduced participation in sport/recreation activities. Classification:   []Signs/symptoms consistent with post-surgical status including decreased ROM, strength and function.   [x]Signs/symptoms consistent with joint sprain/strain   []Signs/symptoms consistent with shoulder impingement   []Signs/symptoms consistent with shoulder/elbow/wrist tendinopathy   []Signs/symptoms consistent with Rotator cuff tear   []Signs/symptoms consistent with labral tear   []Signs/symptoms consistent with postural dysfunction    []Signs/symptoms consistent with Glenohumeral IR Deficit - <45 degrees   []Signs/symptoms consistent with facet dysfunction of cervical/thoracic spine    []Signs/symptoms consistent with pathology which may benefit from Dry needling     []other:     Prognosis/Rehab Potential:      []Excellent   [x]Good    []Fair   []Poor    Tolerance of evaluation/treatment:    []Excellent   [x]Good    []Fair   []Poor  Physical Therapy Evaluation Complexity Justification  [x] A history of present problem with:  [x] no personal factors and/or comorbidities that impact the plan of care;  []1-2 personal factors and/or comorbidities that impact the plan of care  []3 personal factors and/or comorbidities that impact the plan of care  [x] An examination of body systems using standardized tests and measures addressing any of the following: body structures and functions (impairments), activity limitations, and/or participation restrictions;:  [] a total of 1-2 or more elements   [x] a total of 3 or more elements   [] a total of 4 or more elements   [x] A clinical presentation with:  [x] stable and/or uncomplicated characteristics   [] evolving clinical presentation with changing characteristics  [] unstable and unpredictable characteristics;   [x] Clinical decision making of [x] low, [] moderate, [] high complexity using standardized patient assessment instrument and/or measurable assessment of functional outcome. [x] EVAL (LOW) 82769 (typically 20 minutes face-to-face)  [] EVAL (MOD) 79691 (typically 30 minutes face-to-face)  [] EVAL (HIGH) 23397 (typically 45 minutes face-to-face)  [] RE-EVAL       PLAN:  Frequency/Duration:  1-2 days per week for 8 Weeks:  INTERVENTIONS:  [x] Therapeutic exercise including: strength training, ROM, for Upper extremity and core   [x]  NMR activation and proprioception for UE, scap and Core   [x] Manual therapy as indicated for shoulder, scapula and spine to include: Dry Needling/IASTM, STM, PROM, Gr I-IV mobilizations, manipulation. [x] Modalities as needed that may include: thermal agents, E-stim, Biofeedback, US, iontophoresis as indicated  [x] Patient education on joint protection, postural re-education, activity modification, progression of HEP. HEP instruction:   Access Code: [email or web address]  URL: [email or web address]  Date: 06/02/2022  Prepared by: Lorelei Galvez      GOALS:  Patient stated goal: \"To reduce pain.\"  [] Progressing: [] Met: [] Not Met: [] Adjusted    Therapist goals for Patient:   Short Term Goals: To be achieved in: 2 weeks  1. Independent in HEP and progression per patient tolerance, in order to prevent re-injury. [] Progressing: [] Met: [] Not Met: [] Adjusted  2. Patient will have a decrease in pain to facilitate improvement in movement, function, and ADLs as indicated by Functional Deficits. [] Progressing: [] Met: [] Not Met: [] Adjusted    Long Term Goals: To be achieved in: 8 weeks  1. Disability index score of 75% or more per FOTO to assist with reaching prior level of function. [] Progressing: [] Met: [] Not Met: [] Adjusted  2. Patient will demonstrate increased AROM of right shoulder flexion and abduction to 160 deg to allow for proper joint functioning as indicated by patients Functional Deficits. [] Progressing: [] Met: [] Not Met: [] Adjusted  3. Patient will demonstrate an increase in Strength in right shoulder to grossly 4+/5 to allow for proper functional mobility as indicated by patients Functional Deficits. [] Progressing: [] Met: [] Not Met: [] Adjusted  4. Patient will be able to lift a gallon of milk above shoulder height without increased symptoms or restriction. [] Progressing: [] Met: [] Not Met: [] Adjusted  5.  Patient will be able to return to a workout routine without increased symptoms or restrictions in his shoulder. (patient specific functional goal)    [] Progressing: [] Met: [] Not Met: [] Adjusted       Electronically signed by:  Zhanna Kramer, DPT [id number]

## 2022-06-09 ENCOUNTER — HOSPITAL ENCOUNTER (OUTPATIENT)
Dept: PHYSICAL THERAPY | Age: 42
Setting detail: THERAPIES SERIES
Discharge: HOME | End: 2022-06-09
Payer: COMMERCIAL

## 2022-06-09 PROCEDURE — 97110 THERAPEUTIC EXERCISES: CPT | Performed by: PHYSICAL THERAPIST

## 2022-06-09 PROCEDURE — 97140 MANUAL THERAPY 1/> REGIONS: CPT | Performed by: PHYSICAL THERAPIST

## 2022-06-09 NOTE — FLOWSHEET NOTE
[facility],  [address] [place]  Phone: [phone]  Fax [phone]        Date:  2022    Patient Name:  Talib Omalley    :  1980  MRN: [id number]  Restrictions/Precautions:    Medical/Treatment Diagnosis Information:  Diagnosis: Right shoulder pain (M25.511, G89.29)  Treatment Diagnosis: Right shoulder pain (S21.477)  Insurance/Certification information:  PT Insurance Information: [employer]  Physician Information:   Leeland Cowden  Has the plan of care been signed (Y/N):        []  Yes  [x]  No     Date of Patient follow up with Physician: not scheduled      Is this a Progress Report:     []  Yes  [x]  No        If Yes:  Date Range for reporting period:  Beginnin22  Ending    Progress report will be due (10 Rx or 30 days whichever is less): 99       Recertification will be due (POC Duration  / 90 days whichever is less):       Visit # Insurance Allowable Auth Required   In-person 2  []  Yes [x]  No    Telehealth   []  Yes []  No    Total            Functional Scale: FOTO 62/100 (62%)    Date assessed:  22      Therapy Diagnosis/Practice Pattern: D      Number of Comorbidities:  [x]0     []1-2    []3+    Latex Allergy:  [x]NO      []YES  Preferred Language for Healthcare:   [x]English       []other:      Pain level:  0-7/10     SUBJECTIVE:  Patient reports his shoulder feels about the same. Has not been able to do exercises much and has been pretty busy with home renovations. Will be better about them after today.     OBJECTIVE:    Observation:   Test measurements:  NT this date    RESTRICTIONS/PRECAUTIONS:     Exercises/Interventions:     Therapeutic Ex (96122) Sets/sec Reps Notes/CUES      T spine ext over foam roller 5\" 5x ea position 3 positions   Pec stretch in True stretch 15\" 4x 2 with ea foot forward      SL ER 1# 3\" 2 x 10          GTB rows 3\"  2 x 10 Needs cueing   RTB ext 3\" 2 x 10 Needs cueing   Serratus punches 5# 3\"  2  X 10    No $ 3\"  2 x 10                            Patient ed   Compliance with HEP and icing                           Manual Intervention (28447)      Post [address] Joint mobilizations Gr III 2'     Seated T spine distraction mobilization 5'     STM and TP release to posterior cuff 5'                       NMR re-education (20902)   CUES NEEDED   Prone rows 3\" 2 x 10    Prone ext 3\" 2 x 10                                              Therapeutic Activity (89295)                                           Access Code: [email or web address]  URL: [email or web address]  Date: 06/02/2022  Prepared by: Harvey Martinez        Therapeutic Exercise and NMR EXR  [x] (80437) Provided verbal/tactile cueing for activities related to strengthening, flexibility, endurance, ROM  for improvements in scapular, scapulothoracic and UE control with self care, reaching, carrying, lifting, house/yardwork, driving/computer work.    [] (76532) Provided verbal/tactile cueing for activities related to improving balance, coordination, kinesthetic sense, posture, motor skill, proprioception  to assist with  scapular, scapulothoracic and UE control with self care, reaching, carrying, lifting, house/yardwork, driving/computer work. Therapeutic Activities:    [] (18924 or 94266) Provided verbal/tactile cueing for activities related to improving balance, coordination, kinesthetic sense, posture, motor skill, proprioception and motor activation to allow for proper function of scapular, scapulothoracic and UE control with self care, carrying, lifting, driving/computer work.      Home Exercise Program:    [x] (39778) Reviewed/Progressed HEP activities related to strengthening, flexibility, endurance, ROM of scapular, scapulothoracic and UE control with self care, reaching, carrying, lifting, house/yardwork, driving/computer work  [] (47544) Reviewed/Progressed HEP activities related to improving balance, coordination, kinesthetic sense, posture, motor skill, proprioception of scapular, scapulothoracic and UE control with self care, reaching, carrying, lifting, house/yardwork, driving/computer work      Manual Treatments:  PROM / STM / Oscillations-Mobs:  G-I, II, III, IV (PA's, Inf., Post.)  [x] (53143) Provided manual therapy to mobilize soft tissue/joints of cervical/CT, scapular GHJ and UE for the purpose of modulating pain, promoting relaxation,  increasing ROM, reducing/eliminating soft tissue swelling/inflammation/restriction, improving soft tissue extensibility and allowing for proper ROM for normal function with self care, reaching, carrying, lifting, house/yardwork, driving/computer work    Modalities:  None this date    Charges  Timed Code Treatment Minutes: 45'   Total Treatment Minutes: 45'     [] EVAL (LOW) 00224   [] EVAL (MOD) 02506   [] EVAL (HIGH) 12211   [] RE-EVAL     [x] AT(56306) x 2   [] IONTO  [] NMR (68900) x     [] VASO  [x] Manual (28345) x 1     [] Other:  [] TA x      [] Mech Traction (64608)  [] ES(attended) (39568)      [] ES (un) (49739):         GOALS:  Patient stated goal: \"To reduce pain. \"  [] Progressing: [] Met: [] Not Met: [] Adjusted    Therapist goals for Patient:   Short Term Goals: To be achieved in: 2 weeks  1. Independent in HEP and progression per patient tolerance, in order to prevent re-injury. [] Progressing: [] Met: [] Not Met: [] Adjusted  2. Patient will have a decrease in pain to facilitate improvement in movement, function, and ADLs as indicated by Functional Deficits. [] Progressing: [] Met: [] Not Met: [] Adjusted    Long Term Goals: To be achieved in: 8 weeks  1. Disability index score of 75% or more per FOTO to assist with reaching prior level of function. [] Progressing: [] Met: [] Not Met: [] Adjusted  2.  Patient will demonstrate increased AROM of right shoulder flexion and abduction to 160 deg to allow for proper joint functioning as indicated by patients Functional Deficits. [] Progressing: [] Met: [] Not Met: [] Adjusted  3. Patient will demonstrate an increase in Strength in right shoulder to grossly 4+/5 to allow for proper functional mobility as indicated by patients Functional Deficits. [] Progressing: [] Met: [] Not Met: [] Adjusted  4. Patient will be able to lift a gallon of milk above shoulder height without increased symptoms or restriction. [] Progressing: [] Met: [] Not Met: [] Adjusted  5. Patient will be able to return to a workout routine without increased symptoms or restrictions in his shoulder. (patient specific functional goal)    [] Progressing: [] Met: [] Not Met: [] Adjusted     Progression Towards Functional goals:  [] Patient is progressing as expected towards functional goals listed. [] Progression is slowed due to complexities listed. [] Progression has been slowed due to co-morbidities. [x] Plan just implemented, too soon to assess goals progression  [] Other:     ASSESSMENT:  Patient needed cueing initially for scap work, but overall tolerated session well. Discussed compliance with HEP. Overall Progression Towards Functional goals/ Treatment Progress Update:  [] Patient is progressing as expected towards functional goals listed. [] Progression is slowed due to complexities/Impairments listed. [] Progression has been slowed due to co-morbidities.   [x] Plan just implemented, too soon to assess goals progression <30days   [] Goals require adjustment due to lack of progress  [] Patient is not progressing as expected and requires additional follow up with physician  [] Other    Prognosis for POC: [x] Good [] Fair  [] Poor      Patient requires continued skilled intervention: [x] Yes  [] No    Treatment/Activity Tolerance:  [x] Patient able to complete treatment  [] Patient limited by fatigue  [] Patient limited by pain    [] Patient limited by other medical complications  [] Other:       PLAN: See eval  [x] Continue per plan of care [] Alter current plan (see comments above)  [] Plan of care initiated [] Hold pending MD visit [] Discharge      Electronically signed by:  Kayleigh Quinonez PT, DPT [id number]     Note: If patient does not return for scheduled/ recommended follow up visits, this note will serve as a discharge from care along with most recent update on progress.

## 2022-06-15 ENCOUNTER — OFFICE VISIT (OUTPATIENT)
Dept: ORTHOPEDIC SURGERY | Age: 42
End: 2022-06-15

## 2022-06-15 VITALS — WEIGHT: 179 LBS | BODY MASS INDEX: 25.62 KG/M2 | HEIGHT: 70 IN

## 2022-06-15 DIAGNOSIS — M25.512 LEFT SHOULDER PAIN, UNSPECIFIED CHRONICITY: Primary | ICD-10-CM

## 2022-06-15 DIAGNOSIS — S16.1XXA STRAIN OF NECK MUSCLE, INITIAL ENCOUNTER: ICD-10-CM

## 2022-06-15 DIAGNOSIS — M54.12 CERVICAL RADICULOPATHY: ICD-10-CM

## 2022-06-15 PROCEDURE — 99214 OFFICE O/P EST MOD 30 MIN: CPT | Performed by: PHYSICIAN ASSISTANT

## 2022-06-15 RX ORDER — CYCLOBENZAPRINE HCL 10 MG
10 TABLET ORAL 3 TIMES DAILY PRN
Qty: 30 TABLET | Refills: 0 | Status: SHIPPED | OUTPATIENT
Start: 2022-06-15 | End: 2022-06-25

## 2022-06-15 RX ORDER — PREDNISONE 10 MG/1
TABLET ORAL
Qty: 35 TABLET | Refills: 0 | Status: SHIPPED | OUTPATIENT
Start: 2022-06-15

## 2022-06-15 RX ORDER — METHYLPREDNISOLONE 4 MG/1
TABLET ORAL
Qty: 1 KIT | Refills: 0 | Status: SHIPPED | OUTPATIENT
Start: 2022-06-15

## 2022-06-15 NOTE — PROGRESS NOTES
Dr Chucho Salgado      Date /Time 6/15/2022             11:36 AM EDT  Name Cory Fatima             1980   Location  [facility]  MRN [id number]                Chief Complaint   Patient presents with    Shoulder Pain     LEFT SHOULDER         History of Present Illness    Cory Fatima is a 42 y.o. male who presents with  left Shoulder pain. Sent in consultation by Leon Koch DO,. Occupational activities: clerical work. Injury Mechanism: Starting a . Worker's Comp. & legal issues:   none. Previous Treatments: Ice, Heat and NSAIDs    Patient presents to the office today for a new problem. Patient here with a chief complaint of left shoulder pain. Patient's left shoulder became painful approximately 3 days ago. He states he felt pain in the posterior aspect of his shoulder and into his cervical spine when starting a . He has no pain over the anterior or lateral aspects of the shoulder. He has no pain with movement of the shoulder. He does complain of cervical stiffness. Patient denies any weakness in the left upper extremity. Past Medical History  Past Medical History:   Diagnosis Date    Paez syndrome      Past Surgical History:   Procedure Laterality Date    KNEE ARTHROPLASTY Right 2011    NASAL SEPTUM SURGERY  2010     Social History     Tobacco Use    Smoking status: Never Smoker    Smokeless tobacco: Never Used   Substance Use Topics    Alcohol use: Yes     Comment: rarely      Current Outpatient Medications on File Prior to Visit   Medication Sig Dispense Refill    pantoprazole (PROTONIX) 40 MG tablet Take 40 mg by mouth every morning (before breakfast)       No current facility-administered medications on file prior to visit.         ASCVD 10-YEAR RISK SCORE  The 10-year ASCVD risk score (Kameron Henderson, et al., 2013) is: 2%    Values used to calculate the score:      Age: 42 years      Sex: Male      Is Non- : No Diabetic: No      Tobacco smoker: No      Systolic Blood Pressure: 843 mmHg      Is BP treated: No      HDL Cholesterol: 54 mg/dL      Total Cholesterol: 279 mg/dL     Review of Systems  10-point ROS is negative other than HPI. Physical Exam  Based off 1997 Exam Criteria  Ht 5' 10\" (1.778 m)   Wt 179 lb (81.2 kg)   BMI 25.68 kg/m²      Constitutional:       General: He is not in acute distress. Appearance: Normal appearance. Cardiovascular:      Rate and Rhythm: Normal rate and regular rhythm. Pulses: Normal pulses. Pulmonary:      Effort: Pulmonary effort is normal. No respiratory distress. Neurological:      Mental Status: He is alert and oriented to person, place, and time. Mental status is at baseline. Musculoskeletal:  Gait:  normal    Cervical Spine / Shoulder:      RIGHT  LEFT    Cervical Spine Exam  [x] All Neg    [] All Neg     Spurling's  []  []Not tested   [x]  []Not tested    Moreno's  []  []Not tested   []  []Not tested    Pain with rotation  []  []Not tested   [x]  []Not tested    Pain with lateral bending  []  []Not tested   [x]  []Not tested    Paraspinal muscle tenderness  [] Paraspinal  []Midline   [x] Paraspinal  []Not tested    Sensation RIGHT  LEFT    Axillary  [x] Normal []Decreased    [x] Normal []Decreased   Musculocutaneous  [x] Normal  []Decreased   [x] Normal []Decreased   Median  [x] Normal []Decreased   [x] Normal []Decreased   Radial  [x] Normal  []Decreased   [x] Normal []Decreased   Ulnar  [x] Normal  []Decreased   [x] Normal []Decreased   Scapula       Position  [x]Nml  []low  [] lateral  [x]Nml  []low  [] lateral   Dyskinesia  []+ []Abn. Shrug   []+ []Abn. Shrug                     Winging     [x]None   []Med  []Lat   []Worse w/FE  []Med  []Lat  []Worse w/FE   Scapulothoracic Compress.    []Impr Pain  []Impr Motion  []Impr Pain []Impr Motion    Range of Motion Active Passive Active Passive   Forward Elevation 170  170    Abduction 100  100    External Rotation @ side 60  60    External Rotation @ 90 abd 90  90    Internal Rotation @ 90 abd 40  40    Internal Rotation Normal  Normal    End range of motion  [] Pain  [] Pain  [] Pain  [] Pain   Strength RIGHT /5 LEFT /5   Abduction 5  5    External Rotation 5  5    Internal Rotation 5  5    Provocative Signs/Tests  [] All Neg   [x] +      [] -  [] All Neg   [x] +      [] -   Rotator Cuff Signs  [x] All Neg  [] Not tested   [x] All Neg  [] Not tested    Neer  []  []Not tested   []  []Not tested    Carmell Records  []  []Not tested   []  []Not tested    Painful arc  []  []Not tested   []  []Not tested    Greater tuberosity tenderness  []  []Not tested   []  []Not tested    Drop arm  []  []Not tested  []  []Not tested   Superior Escape  []  []Not tested   []  []Not tested    ER Lag  []  []Not tested   []  []Not tested    Belly press  []  []Not tested   []  []Not tested    Lift-off  []  []Not tested   []  []Not tested    Bear hug  []  []Not tested   []  []Not tested    Biceps/Labral Signs  [x] All Neg  [] Not tested   [x] All Neg  [] Not tested    Hagen's  []  []Not tested   []  []Not tested    Speed's  []  []Not tested   []  []Not tested    Dynamic Load Shift/Shear  []  []Not tested   []  []Not tested    Clicking/Popping  []  []Not tested  []  []Not tested   Bicipital groove tenderness  []  []Not tested   []  []Not tested    Edward  []  []Not tested   []  []Not tested    [facility] Joint Signs  [x] All Neg  [] Not tested   [x] All Neg  [] Not tested    [facility] joint tenderness  []  []Not tested   []  []Not tested    Cross-arm adduction pain  []  []Not tested   []  []Not tested    Instability Signs  [] All Neg  [] Not tested  [] All Neg  [] Not tested   General laxity (thumb/elbow)  []  []Not tested   []  []Not tested    Hyperabduction  []  []Not tested   []  []Not tested    Sulcus []Side   []ER    []Side   []ER       Anterior apprehension  []  []Not tested   []  []Not tested    Relocation  []   []Not tested  []  []Not tested     Imaging  Left Shoulder: [address] [facility]  Radiographs: X-rays were ordered today and reviewed of the left shoulder. 3 views. AP, scapular Y, and axillary views. They demonstrate no evidence of fractures or dislocations with well-maintained joint space. Procedure:  Orders Placed This Encounter   Procedures    XR SHOULDER LEFT (MIN 2 VIEWS)     Standing Status:   Future     Number of Occurrences:   1     Standing Expiration Date:   6/15/2023     Order Specific Question:   Reason for exam:     Answer:   pain    [facility]     Referral Priority:   Routine     Referral Type:   Eval and Treat     Referral Reason:   Specialty Services Required     Requested Specialty:   Physical Therapist     Number of Visits Requested:   1         Assessment and Plan  Rere Grissom was seen today for shoulder pain. Diagnoses and all orders for this visit:    Left shoulder pain, unspecified chronicity  -     XR SHOULDER LEFT (MIN 2 VIEWS); Future  -     cyclobenzaprine (FLEXERIL) 10 MG tablet; Take 1 tablet by mouth 3 times daily as needed for Muscle spasms  -     methylPREDNISolone (MEDROL, VITOR,) 4 MG tablet; Take by mouth. -     predniSONE (DELTASONE) 10 MG tablet; Take 1 tablet three times a day for 7 days, take 1 tablet twice a day for 7 days, then take Medrol Dosepak    Strain of neck muscle, initial encounter  -     [facility] ([facility])-[facility]R    Cervical radiculopathy  -     [facility]R        Patient's symptoms are arising from his cervical spine. We will place him on a 20-10-Medrol prednisone taper. He will hold his meloxicam until oral steroids are completed and then restart. We will also place him on Flexeril and in physical therapy. We will see the patient back in 4 weeks or sooner if problems arise. Please take 2 views of the cervical spine prior to clinical visit.     I discussed with Ivan Parker that his history, symptoms, signs and imaging are most consistent with cervical strain and radiculopathy    We reviewed the natural history of these conditions and treatment options ranging from conservative measures (rest, icing, activity modification, physical therapy, pain meds, cortisone injection) to surgical options. In terms of treatment, I recommended continuing with rest, icing, avoidance of painful activities, NSAIDs or pain meds as tolerated, and physical therapy. If these are not effective, cortisone injection can be considered. We discussed surgical options as well, should conservative measures fail. Electronically signed by Horace Escamilla PA-C on 6/15/2022 at 11:36 AM  This dictation was generated by voice recognition computer software. Although all attempts are made to edit the dictation for accuracy, there may be errors in the transcription that are not intended.

## 2022-06-16 ENCOUNTER — HOSPITAL ENCOUNTER (OUTPATIENT)
Dept: PHYSICAL THERAPY | Age: 42
Setting detail: THERAPIES SERIES
Discharge: HOME | End: 2022-06-16
Payer: COMMERCIAL

## 2022-06-16 PROCEDURE — 97140 MANUAL THERAPY 1/> REGIONS: CPT | Performed by: PHYSICAL THERAPIST

## 2022-06-16 PROCEDURE — 97110 THERAPEUTIC EXERCISES: CPT | Performed by: PHYSICAL THERAPIST

## 2022-06-16 NOTE — FLOWSHEET NOTE
[facility],  [address]  Phone: [phone]  Fax [phone]        Date:  2022    Patient Name:  Rhaeel Alford    :  1980  MRN: [id number]  Restrictions/Precautions:    Medical/Treatment Diagnosis Information:  Diagnosis: Right shoulder pain (M25.511, G89.29)  Treatment Diagnosis: Right shoulder pain (R03.370)  Insurance/Certification information:  PT Insurance Information: [employer]  Physician Information:   Mauricio Montelongo  Has the plan of care been signed (Y/N):        []  Yes  [x]  No (faxed at eval)    Date of Patient follow up with Physician: not scheduled      Is this a Progress Report:     []  Yes  [x]  No        If Yes:  Date Range for reporting period:  Beginnin22  Ending    Progress report will be due (10 Rx or 30 days whichever is less):        Recertification will be due (POC Duration  / 90 days whichever is less):       Visit # Insurance Allowable Auth Required   In-person 3  []  Yes [x]  No    Telehealth   []  Yes []  No    Total            Functional Scale: FOTO 62/100 (62%)    Date assessed:  22      Therapy Diagnosis/Practice Pattern: D      Number of Comorbidities:  [x]0     []1-2    []3+    Latex Allergy:  [x]NO      []YES  Preferred Language for Healthcare:   [x]English       []other:      Pain level:  0-5/10     SUBJECTIVE:  Patient states that he has been doing his exercises but reported he injured his neck during some home renovations and states that it has been tight and painful. He states that he was having some pain in his other shoulder related to the neck injury. He reports his biggest difficulty in regards to his shoulder is with overhead lifting and motions. OBJECTIVE:    Observation: Upper thoracic spine stiff during PA mobilizations.  Taut bands and TPs noted in BUT and post cuff   Test measurements:  NT this date    RESTRICTIONS/PRECAUTIONS: NA     Exercises/Interventions:     Therapeutic Ex (03478) Sets/sec Reps Notes/CUES      T spine ext over foam roller 5\" 5x ea position 3 positions      SL ER 1# 3\" 2 x 10          Serratus punches against plinth  3\"  2  X 10    No $ w GTB 3\"  2 x 10                Upper trap stretch  30\" 3            Patient ed   Compliance with HEP and icing                           Manual Intervention (59263)      Post GH Joint mobilizations Gr III 2'     Seated T spine distraction mobilization 5'     STM and TP release to posterior cuff and upper trap  8'     PA mobilizations thoracic spine  4'                 NMR re-education (65337)   CUES NEEDED         Prone ext SB  3\" 2 x 10    Prone rows on SB 3#           3\" 2 x 10                                         Therapeutic Activity (06155)                                           Access Code: [email or web address]  URL: [email or web address]  Date: 06/02/2022  Prepared by: Mainor Frankel        Therapeutic Exercise and NMR EXR  [x] (03678) Provided verbal/tactile cueing for activities related to strengthening, flexibility, endurance, ROM  for improvements in scapular, scapulothoracic and UE control with self care, reaching, carrying, lifting, house/yardwork, driving/computer work.    [] (48488) Provided verbal/tactile cueing for activities related to improving balance, coordination, kinesthetic sense, posture, motor skill, proprioception  to assist with  scapular, scapulothoracic and UE control with self care, reaching, carrying, lifting, house/yardwork, driving/computer work. Therapeutic Activities:    [] (18096 or 02572) Provided verbal/tactile cueing for activities related to improving balance, coordination, kinesthetic sense, posture, motor skill, proprioception and motor activation to allow for proper function of scapular, scapulothoracic and UE control with self care, carrying, lifting, driving/computer work.      Home Exercise Program:    [x] (04422) Reviewed/Progressed HEP more per FOTO to assist with reaching prior level of function. [] Progressing: [] Met: [] Not Met: [] Adjusted  2. Patient will demonstrate increased AROM of right shoulder flexion and abduction to 160 deg to allow for proper joint functioning as indicated by patients Functional Deficits. [] Progressing: [] Met: [] Not Met: [] Adjusted  3. Patient will demonstrate an increase in Strength in right shoulder to grossly 4+/5 to allow for proper functional mobility as indicated by patients Functional Deficits. [] Progressing: [] Met: [] Not Met: [] Adjusted  4. Patient will be able to lift a gallon of milk above shoulder height without increased symptoms or restriction. [] Progressing: [] Met: [] Not Met: [] Adjusted  5. Patient will be able to return to a workout routine without increased symptoms or restrictions in his shoulder. (patient specific functional goal)    [] Progressing: [] Met: [] Not Met: [] Adjusted     Progression Towards Functional goals:  [] Patient is progressing as expected towards functional goals listed. [] Progression is slowed due to complexities listed. [] Progression has been slowed due to co-morbidities. [x] Plan just implemented, too soon to assess goals progression  [] Other:     ASSESSMENT:  Patient demonstrated improved scapular control and strength with exercises. Progressed through exercises without pain or  compensation. Continued stiffness in T spine. Overall Progression Towards Functional goals/ Treatment Progress Update:  [] Patient is progressing as expected towards functional goals listed. [] Progression is slowed due to complexities/Impairments listed. [] Progression has been slowed due to co-morbidities.   [x] Plan just implemented, too soon to assess goals progression <30days   [] Goals require adjustment due to lack of progress  [] Patient is not progressing as expected and requires additional follow up with physician  [] Other    Prognosis for POC: [x] Good [] Fair  [] Poor      Patient requires continued skilled intervention: [x] Yes  [] No    Treatment/Activity Tolerance:  [x] Patient able to complete treatment  [] Patient limited by fatigue  [] Patient limited by pain    [] Patient limited by other medical complications  [] Other:       PLAN: See eval  [x] Continue per plan of care [] Alter current plan (see comments above)  [] Plan of care initiated [] Hold pending MD visit [] Discharge      Electronically signed by:  Juanita Smith, PT, DPT [id number]   [facility] Guillaume SPT/Therapist was present, directed the patient's care, made skilled judgement, and was responsible for assessment and treatment of the patient. Note: If patient does not return for scheduled/ recommended follow up visits, this note will serve as a discharge from care along with most recent update on progress.

## 2022-06-28 ENCOUNTER — TELEPHONE (OUTPATIENT)
Dept: ORTHOPEDIC SURGERY | Age: 42
End: 2022-06-28

## 2022-06-30 ENCOUNTER — HOSPITAL ENCOUNTER (OUTPATIENT)
Dept: PHYSICAL THERAPY | Age: 42
Setting detail: THERAPIES SERIES
Discharge: HOME | End: 2022-06-30
Payer: COMMERCIAL

## 2022-06-30 PROCEDURE — 97110 THERAPEUTIC EXERCISES: CPT | Performed by: PHYSICAL THERAPIST

## 2022-06-30 PROCEDURE — 97161 PT EVAL LOW COMPLEX 20 MIN: CPT | Performed by: PHYSICAL THERAPIST

## 2022-06-30 PROCEDURE — 97140 MANUAL THERAPY 1/> REGIONS: CPT | Performed by: PHYSICAL THERAPIST
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
## 2022-06-30 NOTE — FLOWSHEET NOTE
[facility],  [address]  Phone: [phone]  Fax [phone]      Physical Therapy Treatment Note/ Progress Report:       Date:  2022    Patient Name:  Anant Mancera    :  1980  MRN: [id number]  Restrictions/Precautions:    Medical/Treatment Diagnosis Information:  · Diagnosis: Strain of neck muscle (S16.1XXD), cervical radiculopathy (M54.12)  · Treatment Diagnosis: Neck pain (M54.2), thoracic GTQO(S95.5),  Insurance/Certification information:  PT Insurance Information: [facility]  Physician Information:   FRANCESCO Negron  Has the plan of care been signed (Y/N):        []  Yes  [x]  No     Date of Patient follow up with Physician:     Is this a Progress Report:     []  Yes  [x]  No        If Yes:  Date Range for reporting period:  Beginnin22  Ending    Progress report will be due (10 Rx or 30 days whichever is less): 48       Recertification will be due (POC Duration  / 90 days whichever is less):         Visit # Insurance Allowable Auth Required   In-person 1  []  Yes []  No    Telehealth   []  Yes []  No    Total        Functional Scale: FOTO Neck 48/100 (48%)    Date assessed:  22   Therapy Diagnosis/Practice Pattern: F      Number of Comorbidities:  []0     [x]1-2    []3+     Latex Allergy:  [x]NO      []YES  Preferred Language for Healthcare:   [x]English       []other:    Pain level:  5-7/10     SUBJECTIVE:  See eval    OBJECTIVE: See eval   Observation:    Test measurements:      RESTRICTIONS/PRECAUTIONS:     Exercises/Interventions:   Therapeutic Ex        Sets/sec Reps Notes   Quadruped cat/cow 5\" 10x ea Neutral ext    Quadruped UE threading 5\" 10x ea R/L          Standing UT stretch Left side 30\" 3x          Scap squeezes 5\" 10x Cueing for posture   No $ 5: 15x Cueing for posture               Self massage on tennis ball vs wall discussed for HEP Manual Intervention                                                     NMR re-education          T-spine Ext- foam roll      Chin tucks                         Traction                                     Therapeutic Exercise and NMR EXR  [] (92172) Provided verbal/tactile cueing for activities related to strengthening, flexibility, endurance, ROM  for improvements in cervical, postural, scapular, scapulothoracic and UE control with self care, reaching, carrying, lifting, house/yardwork, driving/computer work.    [] (40982) Provided verbal/tactile cueing for activities related to improving balance, coordination, kinesthetic sense, posture, motor skill, proprioception  to assist with cervical, scapular, scapulothoracic and UE control with self care, reaching, carrying, lifting, house/yardwork, driving/computer work. Therapeutic Activities:    [] (08828 or 14112) Provided verbal/tactile cueing for activities related to improving balance, coordination, kinesthetic sense, posture, motor skill, proprioception and motor activation to allow for proper function of cervical, scapular, scapulothoracic and UE control with self care, carrying, lifting, driving/computer work.      Home Exercise Program:    [x] (49313) Reviewed/Progressed HEP activities related to strengthening, flexibility, endurance, ROM of cervical, scapular, scapulothoracic and UE control with self care, reaching, carrying, lifting, house/yardwork, driving/computer work  [] (11923) Reviewed/Progressed HEP activities related to improving balance, coordination, kinesthetic sense, posture, motor skill, proprioception of cervical, scapular, scapulothoracic and UE control with self care, reaching, carrying, lifting, house/yardwork, driving/computer work      Manual Treatments:  PROM / STM / Oscillations-Mobs:  G-I, II, III, IV (PA's, Inf., Post.)  [] (53579) Provided manual therapy to mobilize soft tissue/joints of cervical/CT, scapular GHJ and UE for the purpose of decreasing headache, modulating pain, promoting relaxation,  increasing ROM, reducing/eliminating soft tissue swelling/inflammation/restriction, improving soft tissue extensibility and allowing for proper ROM for normal function with self care, reaching, carrying, lifting, house/yardwork, driving/computer work    Modalities:  None this date    Charges  Timed Code Treatment Minutes: 25'   Total Treatment Minutes: 39'       [x] EVAL (LOW) 26006   [] EVAL (MOD) 30899   [] EVAL (HIGH) 73355   [] RE-EVAL     [x] FY(82842) x  1   [] IONTO  [] NMR (38906) x     [] VASO  [x] Manual (71154) x  1    [] Other:  [] TA x      [] Mech Traction (37678)  [] ES(attended) (42035)      [] ES (un) (98333):     GOALS:  HEP instruction:   Access Code: [email or web address]  URL: [email or web address]  Date: 06/30/2022  Prepared by: Gena Nowak    GOALS:  Patient stated goal: \"Eliminate pain/tension. \"  [] Progressing: [] Met: [] Not Met: [] Adjusted    Therapist goals for Patient:   Short Term Goals: To be achieved in: 2 weeks  1. Independent in HEP and progression per patient tolerance, in order to prevent re-injury. [] Progressing: [] Met: [] Not Met: [] Adjusted  2. Patient will have a decrease in pain to facilitate improvement in movement, function, and ADLs as indicated by Functional Deficits. [] Progressing: [] Met: [] Not Met: [] Adjusted    Long Term Goals: To be achieved in: 8 weeks  1. Disability index score of 69% or more for the FOTO Neck questionnaire to assist with reaching prior level of function. [] Progressing: [] Met: [] Not Met: [] Adjusted  2. Patient will demonstrate increased AROM of bilateral rotation and extension to WNL without pain to allow for proper joint functioning as indicated by patients Functional Deficits. [] Progressing: [] Met: [] Not Met: [] Adjusted  3.  Patient will demonstrate an increase in postural awareness and control and activation of  Deep cervical stabilizers to allow for proper functional mobility as indicated by patients Functional Deficits. [] Progressing: [] Met: [] Not Met: [] Adjusted   4. Patient will be able to sleep through the night without increased symptoms or restriction in his neck/back. [] Progressing: [] Met: [] Not Met: [] Adjusted  5. Patient will be able to drive without increased symptoms or restrictions. (patient specific functional goal) [] Progressing: [] Met: [] Not Met: [] Adjusted      Overall Progression Towards Functional goals/ Treatment Progress Update:  [] Patient is progressing as expected towards functional goals listed. [] Progression is slowed due to complexities/Impairments listed. [] Progression has been slowed due to co-morbidities. [x] Plan just implemented, too soon to assess goals progression <30days   [] Goals require adjustment due to lack of progress  [] Patient is not progressing as expected and requires additional follow up with physician  [] Other    Prognosis for POC: [x] Good [] Fair  [] Poor      Patient requires continued skilled intervention: [x] Yes  [] No      ASSESSMENT:  See eval    Treatment/Activity Tolerance:  [x] Patient tolerated treatment well [] Patient limited by fatique  [] Patient limited by pain  [] Patient limited by other medical complications  [] Other:     Prognosis: [] Good [] Fair  [] Poor    Patient Requires Follow-up: [x] Yes  [] No    PLAN: See eval  [] Continue per plan of care [] Alter current plan (see comments above)  [x] Plan of care initiated [] Hold pending MD visit [] Discharge      Electronically signed by:  Eddie Quintero PT, DPT [id number]   NELSON Diaz  Therapist was present, directed the patient's care, made skilled judgement, and was responsible for assessment and treatment of the patient. Note: If patient does not return for scheduled/ recommended follow up visits, this note will serve as a discharge from care along with most recent update on progress.

## 2022-07-07 ENCOUNTER — HOSPITAL ENCOUNTER (OUTPATIENT)
Dept: PHYSICAL THERAPY | Age: 42
Setting detail: THERAPIES SERIES
Discharge: HOME | End: 2022-07-07
Payer: COMMERCIAL

## 2022-07-07 PROCEDURE — 97110 THERAPEUTIC EXERCISES: CPT | Performed by: PHYSICAL THERAPIST

## 2022-07-07 PROCEDURE — 97140 MANUAL THERAPY 1/> REGIONS: CPT | Performed by: PHYSICAL THERAPIST

## 2022-07-07 NOTE — FLOWSHEET NOTE
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),  Insurance/Certification information:  PT Insurance Information: [employer]  Physician Information:   FRANCESCO Camacho  Has the plan of care been signed (Y/N):        [x]  Yes  []  No     Date of Patient follow up with Physician:     Is this a Progress Report:     []  Yes  [x]  No        If Yes:  Date Range for reporting period:  Beginnin22  Ending    Progress report will be due (10 Rx or 30 days whichever is less):        Recertification will be due (POC Duration  / 90 days whichever is less):         Visit # Insurance Allowable Auth Required   In-person 2  []  Yes []  No    Telehealth   []  Yes []  No    Total        Functional Scale: FOTO Neck 48/100 (48%)    Date assessed:  22   Therapy Diagnosis/Practice Pattern: F      Number of Comorbidities:  []0     [x]1-2    []3+     Latex Allergy:  [x]NO      []YES  Preferred Language for Healthcare:   [x]English       []other:    Pain level:  5-7/10     SUBJECTIVE:  States that he has pain is neck that radiates into the shoulder blade when he looks upward. He reports pain that goes down into the left shoulder.  Denies N/T.    OBJECTIVE: Stiff CT junction PA, tender levator scapulae and upper trap with trigger points noted   Observation:    Test measurements:  DNT     RESTRICTIONS/PRECAUTIONS:     Exercises/Interventions:   Therapeutic Ex        Sets/sec Reps Notes   Quadruped cat/cow 5\" 10x ea Pain with extension   Quadruped UE threading 5\" 10x ea R/L Some pain          Standing UT stretch Left side 30\" 3x With light OP   Standing levator stretch  20\"  5  With light OP   No $ YTB  5: 15x Cueing for posture   TB Ext 2 10  GTB; blue too heavy; needs cueing for posture    Rows GTB  2  10  Cueing for posture        Cervical extension towel assisted  3\" 5x    Patient education    HEP, changing positions,  strengthening and mobility, walking, shoulder tensing and overactivation,                      Manual Intervention      23'     IASTM to thoracic paraspinals and left upper trap  6'     PA thoracic and cervical c5-t8 8'     Passive side glides to left  3'      STM suboccipitals and manual cervical traction 4'     Max opening stretch 2'                 NMR re-education          HEP    Chin tucks supine 3\" 20x Max cueing needed for proper form    Chin tucks vs ball on wall 1 10                Traction                                     Therapeutic Exercise and NMR EXR  [x] (48673) Provided verbal/tactile cueing for activities related to strengthening, flexibility, endurance, ROM  for improvements in cervical, postural, scapular, scapulothoracic and UE control with self care, reaching, carrying, lifting, house/yardwork, driving/computer work.    [] (67656) Provided verbal/tactile cueing for activities related to improving balance, coordination, kinesthetic sense, posture, motor skill, proprioception  to assist with cervical, scapular, scapulothoracic and UE control with self care, reaching, carrying, lifting, house/yardwork, driving/computer work. Therapeutic Activities:    [] (49273 or 69665) Provided verbal/tactile cueing for activities related to improving balance, coordination, kinesthetic sense, posture, motor skill, proprioception and motor activation to allow for proper function of cervical, scapular, scapulothoracic and UE control with self care, carrying, lifting, driving/computer work.      Home Exercise Program:    [x] (64043) Reviewed/Progressed HEP activities related to strengthening, flexibility, endurance, ROM of cervical, scapular, scapulothoracic and UE control with self care, reaching, carrying, lifting, house/yardwork, driving/computer work  [] (91855) Reviewed/Progressed HEP activities related to improving balance, coordination, kinesthetic sense, posture, motor skill, proprioception of cervical, scapular, scapulothoracic and UE control with self care, reaching, carrying, lifting, house/yardwork, driving/computer work      Manual Treatments:  PROM / STM / Oscillations-Mobs:  G-I, II, III, IV (PA's, Inf., Post.)  [x] (09729) Provided manual therapy to mobilize soft tissue/joints of cervical/CT, scapular GHJ and UE for the purpose of decreasing headache, modulating pain, promoting relaxation,  increasing ROM, reducing/eliminating soft tissue swelling/inflammation/restriction, improving soft tissue extensibility and allowing for proper ROM for normal function with self care, reaching, carrying, lifting, house/yardwork, driving/computer work    Modalities:  None this date    Charges  Timed Code Treatment Minutes: 45'   Total Treatment Minutes: 39'       [] EVAL (LOW) 15665   [] EVAL (MOD) 74134   [] EVAL (HIGH) 96813   [] RE-EVAL     [x] II(29046) x  1   [] IONTO  [] NMR (55094) x     [] VASO  [x] Manual (95765) x  2    [] Other:  [] TA x      [] Mech Traction (66000)  [] ES(attended) (14540)      [] ES (un) (18624):     GOALS:  HEP instruction:   Access Code: [email or web address]  URL: [email or web address]  Date: 06/30/2022  Prepared by: Marcella Luciano    GOALS:  Patient stated goal: \"Eliminate pain/tension. \"  [] Progressing: [] Met: [] Not Met: [] Adjusted    Therapist goals for Patient:   Short Term Goals: To be achieved in: 2 weeks  1. Independent in HEP and progression per patient tolerance, in order to prevent re-injury. [] Progressing: [x] Met: [] Not Met: [] Adjusted  2.  Patient will have a decrease in pain to facilitate improvement in movement, function, and ADLs as indicated by Functional Deficits. [x] Progressing: [] Met: [] Not Met: [] Adjusted    Long Term Goals: To be achieved in: 8 weeks  1. Disability index score of 69% or more for the FOTO Neck questionnaire to assist with reaching prior level of function. [] Progressing: [] Met: [] Not Met: [] Adjusted  2. Patient will demonstrate increased AROM of bilateral rotation and extension to WNL without pain to allow for proper joint functioning as indicated by patients Functional Deficits. [] Progressing: [] Met: [] Not Met: [] Adjusted  3. Patient will demonstrate an increase in postural awareness and control and activation of  Deep cervical stabilizers to allow for proper functional mobility as indicated by patients Functional Deficits. [] Progressing: [] Met: [] Not Met: [] Adjusted   4. Patient will be able to sleep through the night without increased symptoms or restriction in his neck/back. [] Progressing: [] Met: [] Not Met: [] Adjusted  5. Patient will be able to drive without increased symptoms or restrictions. (patient specific functional goal) [] Progressing: [] Met: [] Not Met: [] Adjusted      Overall Progression Towards Functional goals/ Treatment Progress Update:  [] Patient is progressing as expected towards functional goals listed. [] Progression is slowed due to complexities/Impairments listed. [] Progression has been slowed due to co-morbidities. [x] Plan just implemented, too soon to assess goals progression <30days   [] Goals require adjustment due to lack of progress  [] Patient is not progressing as expected and requires additional follow up with physician  [] Other    Prognosis for POC: [x] Good [] Fair  [] Poor      Patient requires continued skilled intervention: [x] Yes  [] No      ASSESSMENT:  Patient experienced pain into shoulder and scapula during treatment today with the neck in extension, especially in prone.  Patient demonstrates overactivity of upper traps, levator scapula, and suboccipitals. This date discussed HEP, possible walking program,  forward head posture, and scapular musculature activation while relaxing the posterior neck muscles and upper trap. Needed max cueing for posture throughout session. Discussed the tendency of the patient to activate upper trapezius. Patient demonstrates intermittent pain during neck and scapular strengthening and mobility exercises. Patient with difficulty describing symptoms throughout session. Treatment/Activity Tolerance:  [x] Patient tolerated treatment well [] Patient limited by fatique  [] Patient limited by pain  [] Patient limited by other medical complications  [] Other:     Prognosis: [] Good [] Fair  [] Poor    Patient Requires Follow-up: [x] Yes  [] No    PLAN: See eval  [x] Continue per plan of care [] Alter current plan (see comments above)  [x] Plan of care initiated [] Hold pending MD visit [] Discharge      Electronically signed by:  Fiona West PT, DPT [id number]   Mayra Hussein [facility]  Therapist was present, directed the patient's care, made skilled judgement, and was responsible for assessment and treatment of the patient. Note: If patient does not return for scheduled/ recommended follow up visits, this note will serve as a discharge from care along with most recent update on progress.

## 2022-07-14 ENCOUNTER — HOSPITAL ENCOUNTER (OUTPATIENT)
Dept: PHYSICAL THERAPY | Age: 42
Setting detail: THERAPIES SERIES
Discharge: HOME | End: 2022-07-14
Payer: COMMERCIAL

## 2022-07-14 PROCEDURE — 97140 MANUAL THERAPY 1/> REGIONS: CPT | Performed by: PHYSICAL THERAPIST

## 2022-07-14 PROCEDURE — 97110 THERAPEUTIC EXERCISES: CPT | Performed by: PHYSICAL THERAPIST

## 2022-07-14 PROCEDURE — 97112 NEUROMUSCULAR REEDUCATION: CPT | Performed by: PHYSICAL THERAPIST

## 2022-07-14 NOTE — FLOWSHEET NOTE
[facility],  [address]  Phone: [phone]  Fax [phone]      Physical Therapy Treatment Note/ Progress Report:       Date:  2022    Patient Name:  Laurel Alexander    :  1980  MRN: [id number]  Restrictions/Precautions:    Medical/Treatment Diagnosis Information:  · Diagnosis: Strain of neck muscle (S16.1XXD), cervical radiculopathy (M54.12)  · Treatment Diagnosis: Neck pain (M54.2), thoracic BFYN(O78.8),  Insurance/Certification information:  PT Insurance Information: [facility]  Physician Information:   FRANCESCO Shaikh  Has the plan of care been signed (Y/N):        [x]  Yes  []  No     Date of Patient follow up with Physician:     Is this a Progress Report:     []  Yes  [x]  No        If Yes:  Date Range for reporting period:  Beginnin22  Ending    Progress report will be due (10 Rx or 30 days whichever is less):        Recertification will be due (POC Duration  / 90 days whichever is less):         Visit # Insurance Allowable Auth Required   In-person 3  []  Yes []  No    Telehealth   []  Yes []  No    Total        Functional Scale: FOTO Neck 48/100 (48%)    Date assessed:  22   Therapy Diagnosis/Practice Pattern: F      Number of Comorbidities:  []0     [x]1-2    []3+     Latex Allergy:  [x]NO      []YES  Preferred Language for Healthcare:   [x]English       []other:    Pain level:  5-7/10     SUBJECTIVE:  Patient denies numbness and tingling but reports radiating pain into the shoulder.  Seems to be about the same as last week. '    OBJECTIVE: Stiff CT junction PA, tender levator scapulae and upper trap with trigger points noted   Observation:    Test measurements:  Cervical rotation: R:  52 L: 42 Cervical flexion 37 Cervical extension 40     RESTRICTIONS/PRECAUTIONS:     Exercises/Interventions:   Therapeutic Ex        Sets/sec Reps Notes   Quadruped cat/cow 5\" 10x ea Pain with extension         With light OP   pec stretch in true stretch 20 4x    Lat stretch in true stretch 20\" 4x  Cueing for posture        Patient education    HEP, changing positions,  strengthening and mobility, walking, shoulder tensing and overactivation,    Open book stretch  1 10 ea R/L  Cannot go full range to the left  without pain          Standing bilateral upper rotation with neutral c-spine  1 10     Manual Intervention      16'     STM to upper trap and rhomboids  5'     PA thoracic and cervical C5-T8 5'     Passive side glides to left  4'      STM suboccipitals  2'                     NMR re-education          HEP    Chin tucks with shoulder flexion  3\" 20x Max cueing needed for proper form       Prone HAB on SB 3\" 15x Needs cueing   BTB rows 3\" 2 x 10 Cueing for scap and neck position   RTB ext 3\" 2 x 10    Traction                                     Therapeutic Exercise and NMR EXR  [x] (87765) Provided verbal/tactile cueing for activities related to strengthening, flexibility, endurance, ROM  for improvements in cervical, postural, scapular, scapulothoracic and UE control with self care, reaching, carrying, lifting, house/yardwork, driving/computer work.    [] (11538) Provided verbal/tactile cueing for activities related to improving balance, coordination, kinesthetic sense, posture, motor skill, proprioception  to assist with cervical, scapular, scapulothoracic and UE control with self care, reaching, carrying, lifting, house/yardwork, driving/computer work. Therapeutic Activities:    [] (08893 or 34456) Provided verbal/tactile cueing for activities related to improving balance, coordination, kinesthetic sense, posture, motor skill, proprioception and motor activation to allow for proper function of cervical, scapular, scapulothoracic and UE control with self care, carrying, lifting, driving/computer work.      Home Exercise Program:    [x] (65465) Reviewed/Progressed HEP activities related to strengthening, flexibility, endurance, ROM of cervical, scapular, scapulothoracic and UE control with self care, reaching, carrying, lifting, house/yardwork, driving/computer work  [] (75926) Reviewed/Progressed HEP activities related to improving balance, coordination, kinesthetic sense, posture, motor skill, proprioception of cervical, scapular, scapulothoracic and UE control with self care, reaching, carrying, lifting, house/yardwork, driving/computer work      Manual Treatments:  PROM / STM / Oscillations-Mobs:  G-I, II, III, IV (PA's, Inf., Post.)  [x] (43666) Provided manual therapy to mobilize soft tissue/joints of cervical/CT, scapular GHJ and UE for the purpose of decreasing headache, modulating pain, promoting relaxation,  increasing ROM, reducing/eliminating soft tissue swelling/inflammation/restriction, improving soft tissue extensibility and allowing for proper ROM for normal function with self care, reaching, carrying, lifting, house/yardwork, driving/computer work    Modalities:  None this date    Charges  Timed Code Treatment Minutes: 45'   Total Treatment Minutes: 39'       [] EVAL (LOW) 34257   [] EVAL (MOD) 01436   [] EVAL (HIGH) 20671   [] RE-EVAL     [x] HJ(50791) x  1   [] IONTO  [x] NMR (01282) x  1   [] VASO  [x] Manual (94767) x  1    [] Other:  [] TA x      [] Mech Traction (82044)  [] ES(attended) (95067)      [] ES (un) (55705):     GOALS:  HEP instruction:   Access Code: [email or web address]  URL: [email or web address]  Date: 06/30/2022  Prepared by: Perlita Flanagan    GOALS:  Patient stated goal: \"Eliminate pain/tension. \"  [] Progressing: [] Met: [] Not Met: [] Adjusted    Therapist goals for Patient:   Short Term Goals: To be achieved in: 2 weeks  1. Independent in HEP and progression per patient tolerance, in order to prevent re-injury. [] Progressing: [x] Met: [] Not Met: [] Adjusted  2.  Patient will have a decrease in pain to facilitate improvement in movement, function, and ADLs as indicated by Functional Deficits. [x] Progressing: [] Met: [] Not Met: [] Adjusted    Long Term Goals: To be achieved in: 8 weeks  1. Disability index score of 69% or more for the FOTO Neck questionnaire to assist with reaching prior level of function. [] Progressing: [] Met: [] Not Met: [] Adjusted  2. Patient will demonstrate increased AROM of bilateral rotation and extension to WNL without pain to allow for proper joint functioning as indicated by patients Functional Deficits. [] Progressing: [] Met: [] Not Met: [] Adjusted  3. Patient will demonstrate an increase in postural awareness and control and activation of  Deep cervical stabilizers to allow for proper functional mobility as indicated by patients Functional Deficits. [] Progressing: [] Met: [] Not Met: [] Adjusted   4. Patient will be able to sleep through the night without increased symptoms or restriction in his neck/back. [] Progressing: [] Met: [] Not Met: [] Adjusted  5. Patient will be able to drive without increased symptoms or restrictions. (patient specific functional goal) [] Progressing: [] Met: [] Not Met: [] Adjusted      Overall Progression Towards Functional goals/ Treatment Progress Update:  [] Patient is progressing as expected towards functional goals listed. [] Progression is slowed due to complexities/Impairments listed. [] Progression has been slowed due to co-morbidities. [x] Plan just implemented, too soon to assess goals progression <30days   [] Goals require adjustment due to lack of progress  [] Patient is not progressing as expected and requires additional follow up with physician  [] Other    Prognosis for POC: [x] Good [] Fair  [] Poor      Patient requires continued skilled intervention: [x] Yes  [] No      ASSESSMENT:  Patient experienced less referral of pain into shoulder during therapy session today. Patient demonstrates limited mobility in CT junction that improves with PA mobilization techniques.  Patient is very tight in the cervicothoracic area and has a tendency to guard and tense his shoulders during exercises and needs frequent cueing for posture throughout session. Demonstrates improved cervical extension and flexion as compared to initial visit. Treatment/Activity Tolerance:  [x] Patient tolerated treatment well [] Patient limited by fatique  [] Patient limited by pain  [] Patient limited by other medical complications  [] Other:     Prognosis: [] Good [x] Fair  [] Poor    Patient Requires Follow-up: [x] Yes  [] No    PLAN: See eval  [x] Continue per plan of care [] Alter current plan (see comments above)  [] Plan of care initiated [] Hold pending MD visit [] Discharge      Electronically signed by:  Sara Holder PT, DPT [id number]   NELSON Marcial  Therapist was present, directed the patient's care, made skilled judgement, and was responsible for assessment and treatment of the patient. Note: If patient does not return for scheduled/ recommended follow up visits, this note will serve as a discharge from care along with most recent update on progress.

## 2022-07-19 ENCOUNTER — OFFICE VISIT (OUTPATIENT)
Dept: ORTHOPEDIC SURGERY | Age: 42
End: 2022-07-19
Payer: COMMERCIAL

## 2022-07-19 VITALS — BODY MASS INDEX: 25.62 KG/M2 | HEIGHT: 70 IN | WEIGHT: 179 LBS

## 2022-07-19 DIAGNOSIS — M54.12 CERVICAL RADICULOPATHY: ICD-10-CM

## 2022-07-19 DIAGNOSIS — S16.1XXA STRAIN OF NECK MUSCLE, INITIAL ENCOUNTER: Primary | ICD-10-CM

## 2022-07-19 PROCEDURE — 99214 OFFICE O/P EST MOD 30 MIN: CPT | Performed by: PHYSICIAN ASSISTANT

## 2022-07-19 NOTE — PROGRESS NOTES
Dr Stone Cedillo      Date /Time 7/19/2022             11:36 AM EDT  Name Connie Barcenas             1980   Location  [facility]  MRN [id number]                Chief Complaint   Patient presents with    Neck Pain      CK NECK        History of Present Illness    Connie Barcenas is a 42 y.o. male who presents with  left Shoulder pain. Occupational activities: clerical work. Injury Mechanism: Starting a . Worker's Comp. & legal issues:   none. Previous Treatments: Ice, Heat and NSAIDs    Patient presents to the office today for a follow-up visit. Patient being treated for left shoulder and cervical spine. At his last visit he was placed on a prednisone and steroid taper. He was also placed in physical therapy. His neck continues to be symptomatic with posterior left shoulder, proximal arm, and distal upper extremity radicular symptoms. He denies any weakness. No new injury or trauma. Previous history: Patient presents to the office today for a new problem. Patient here with a chief complaint of left shoulder pain. Patient's left shoulder became painful approximately 3 days ago. He states he felt pain in the posterior aspect of his shoulder and into his cervical spine when starting a . He has no pain over the anterior or lateral aspects of the shoulder. He has no pain with movement of the shoulder. He does complain of cervical stiffness. Patient denies any weakness in the left upper extremity.     Past Medical History  Past Medical History:   Diagnosis Date    Paez syndrome      Past Surgical History:   Procedure Laterality Date    KNEE ARTHROPLASTY Right 2011    NASAL SEPTUM SURGERY  2010     Social History     Tobacco Use    Smoking status: Never    Smokeless tobacco: Never   Substance Use Topics    Alcohol use: Yes     Comment: rarely      Current Outpatient Medications on File Prior to Visit   Medication Sig Dispense Refill    methylPREDNISolone (MEDROL, VITOR,) 4 MG tablet Take by mouth. 1 kit 0    predniSONE (DELTASONE) 10 MG tablet Take 1 tablet three times a day for 7 days, take 1 tablet twice a day for 7 days, then take Medrol Dosepak 35 tablet 0    pantoprazole (PROTONIX) 40 MG tablet Take 40 mg by mouth every morning (before breakfast)       No current facility-administered medications on file prior to visit. ASCVD 10-YEAR RISK SCORE  The 10-year ASCVD risk score (Jimena Lerma., et al., 2013) is: 2%    Values used to calculate the score:      Age: 42 years      Sex: Male      Is Non- : No      Diabetic: No      Tobacco smoker: No      Systolic Blood Pressure: 996 mmHg      Is BP treated: No      HDL Cholesterol: 54 mg/dL      Total Cholesterol: 279 mg/dL     Review of Systems  10-point ROS is negative other than HPI. Physical Exam  Based off 1997 Exam Criteria  Ht 5' 10\" (1.778 m)   Wt 179 lb (81.2 kg)   BMI 25.68 kg/m²      Constitutional:       General: He is not in acute distress. Appearance: Normal appearance. Cardiovascular:      Rate and Rhythm: Normal rate and regular rhythm. Pulses: Normal pulses. Pulmonary:      Effort: Pulmonary effort is normal. No respiratory distress. Neurological:      Mental Status: He is alert and oriented to person, place, and time. Mental status is at baseline.      Musculoskeletal:  Gait:  normal    Cervical Spine / Shoulder:      RIGHT  LEFT    Cervical Spine Exam  [x] All Neg    [] All Neg     Spurling's  []  []Not tested   [x]  []Not tested    Moreno's  []  []Not tested   []  []Not tested    Pain with rotation  []  []Not tested   [x]  []Not tested    Pain with lateral bending  []  []Not tested   [x]  []Not tested    Paraspinal muscle tenderness  [] Paraspinal  []Midline   [x] Paraspinal  []Not tested    Sensation RIGHT  LEFT    Axillary  [x] Normal []Decreased    [x] Normal []Decreased   Musculocutaneous  [x] Normal  []Decreased   [x] Normal []Decreased   Median [x] Normal []Decreased   [x] Normal []Decreased   Radial  [x] Normal  []Decreased   [x] Normal []Decreased   Ulnar  [x] Normal  []Decreased   [x] Normal []Decreased   Scapula       Position  [x]Nml  []low  [] lateral  [x]Nml  []low  [] lateral   Dyskinesia  []+ []Abn. Shrug   []+ []Abn. Shrug                     Winging     [x]None   []Med  []Lat   []Worse w/FE  []Med  []Lat  []Worse w/FE   Scapulothoracic Compress.    []Impr Pain  []Impr Motion  []Impr Pain []Impr Motion    Range of Motion Active Passive Active Passive   Forward Elevation 170  170    Abduction 100  100    External Rotation @ side 60  60    External Rotation @ 90 abd 90  90    Internal Rotation @ 90 abd 40  40    Internal Rotation Normal  Normal    End range of motion  [] Pain  [] Pain  [] Pain  [] Pain   Strength RIGHT /5 LEFT /5   Abduction 5  5    External Rotation 5  5    Internal Rotation 5  5    Provocative Signs/Tests  [] All Neg   [x] +      [] -  [] All Neg   [x] +      [] -   Rotator Cuff Signs  [x] All Neg  [] Not tested   [x] All Neg  [] Not tested    Neer  []  []Not tested   []  []Not tested    Ruth Jump  []  []Not tested   []  []Not tested    Painful arc  []  []Not tested   []  []Not tested    Greater tuberosity tenderness  []  []Not tested   []  []Not tested    Drop arm  []  []Not tested  []  []Not tested   Superior Escape  []  []Not tested   []  []Not tested    ER Lag  []  []Not tested   []  []Not tested    Belly press  []  []Not tested   []  []Not tested    Lift-off  []  []Not tested   []  []Not tested    Bear hug  []  []Not tested   []  []Not tested    Biceps/Labral Signs  [x] All Neg  [] Not tested   [x] All Neg  [] Not tested    Hagen's  []  []Not tested   []  []Not tested    Speed's  []  []Not tested   []  []Not tested    Dynamic Load Shift/Shear  []  []Not tested   []  []Not tested    Clicking/Popping  []  []Not tested  []  []Not tested   Bicipital groove tenderness  []  []Not tested   []  []Not tested    Edward  []  []Not tested []  []Not tested    [facility] Joint Signs  [x] All Neg  [] Not tested   [x] All Neg  [] Not tested    [facility] joint tenderness  []  []Not tested   []  []Not tested    Cross-arm adduction pain  []  []Not tested   []  []Not tested    Instability Signs  [] All Neg  [] Not tested  [] All Neg  [] Not tested   General laxity (thumb/elbow)  []  []Not tested   []  []Not tested    Hyperabduction  []  []Not tested   []  []Not tested    Sulcus []Side   []ER    []Side   []ER       Anterior apprehension  []  []Not tested   []  []Not tested    Relocation  []   []Not tested  []  []Not tested     Imaging  Left Shoulder: [facility] [place]  Previous Radiographs: X-rays were ordered today and reviewed of the left shoulder. 3 views. AP, scapular Y, and axillary views. They demonstrate no evidence of fractures or dislocations with well-maintained joint space. Procedure:  Orders Placed This Encounter   Procedures    XR CERVICAL SPINE (2-3 VIEWS)     Standing Status:   Future     Number of Occurrences:   1     Standing Expiration Date:   7/19/2023     Order Specific Question:   Reason for exam:     Answer:   pain    AFL - Dottie Severino MD, Physical Medicine and Rehabilitation, [facility]     Referral Priority:   Routine     Referral Type:   Eval and Treat     Referral Reason:   Specialty Services Required     Referred to Provider:   Alexander Tatum MD     Requested Specialty:   Physical Medicine and Rehab     Number of Visits Requested:   1    EMG     Standing Status:   Future     Standing Expiration Date:   8/19/2022     Order Specific Question:   Which body part? Answer:   DAYANARA         Assessment and Plan  Elana Nguyen was seen today for neck pain. Diagnoses and all orders for this visit:    Strain of neck muscle, initial encounter  -     XR CERVICAL SPINE (2-3 VIEWS); Future    Cervical radiculopathy  -     XR CERVICAL SPINE (2-3 VIEWS); Future      Patient's symptoms are arising from his cervical spine.   Patient's shoulder is doing better. Fortunately his neck is not improved. I have ordered him an MRI of the cervical spine and an EMG of the left upper extremity. He is placed on Mobic. I will see the patient back if his shoulder is still painful after they have treated the cervical spine`. I have asked him to see Ludmila Contreras PA-C for his cervical spine. I discussed with Chapito Duarte that his history, symptoms, signs and imaging are most consistent with cervical strain and radiculopathy    We reviewed the natural history of these conditions and treatment options ranging from conservative measures (rest, icing, activity modification, physical therapy, pain meds, cortisone injection) to surgical options. In terms of treatment, I recommended continuing with rest, icing, avoidance of painful activities, NSAIDs or pain meds as tolerated, and physical therapy. If these are not effective, cortisone injection can be considered. We discussed surgical options as well, should conservative measures fail. Electronically signed by Viet Porter PA-C on 7/19/2022 at 3:43 PM  This dictation was generated by voice recognition computer software. Although all attempts are made to edit the dictation for accuracy, there may be errors in the transcription that are not intended.

## 2022-07-20 ENCOUNTER — TELEPHONE (OUTPATIENT)
Dept: ORTHOPEDIC SURGERY | Age: 42
End: 2022-07-20

## 2022-07-20 NOTE — TELEPHONE ENCOUNTER
MRI IS APPROVED FOR Berhane Oropeza. [employer] WILL CALL PATIENT TO SCHEDULE MRI. ONCE MRI IS SCHEDULED, PATIENT MAY CALL TO MAKE A FOLLOW UP APPOINTMENT IN OFFICE FOR RESULTS.

## 2022-07-21 ENCOUNTER — HOSPITAL ENCOUNTER (OUTPATIENT)
Dept: PHYSICAL THERAPY | Age: 42
Setting detail: THERAPIES SERIES
Discharge: HOME | End: 2022-07-21
Payer: COMMERCIAL

## 2022-07-21 PROCEDURE — 97140 MANUAL THERAPY 1/> REGIONS: CPT | Performed by: PHYSICAL THERAPIST

## 2022-07-21 PROCEDURE — 97110 THERAPEUTIC EXERCISES: CPT | Performed by: PHYSICAL THERAPIST

## 2022-07-21 PROCEDURE — 97112 NEUROMUSCULAR REEDUCATION: CPT | Performed by: PHYSICAL THERAPIST

## 2022-07-21 NOTE — FLOWSHEET NOTE
[place] [address] [facility], [address] [place]  Phone: [phone]  Fax [phone]      Physical Therapy Treatment Note/ Progress Report:       Date:  2022    Patient Name:  Whit Brito    :  1980  MRN: [id number]  Restrictions/Precautions:    Medical/Treatment Diagnosis Information:  Diagnosis: Strain of neck muscle (S16.1XXD), cervical radiculopathy (M54.12)  Treatment Diagnosis: Neck pain (M54.2), thoracic QOWO(K99.9),  Insurance/Certification information:  PT Insurance Information: [facility]  Physician Information:   FRANCESCO Camacho  Has the plan of care been signed (Y/N):        [x]  Yes  []  No     Date of Patient follow up with Physician:     Is this a Progress Report:     []  Yes  [x]  No        If Yes:  Date Range for reporting period:  Beginnin22  Ending    Progress report will be due (10 Rx or 30 days whichever is less):        Recertification will be due (POC Duration  / 90 days whichever is less):         Visit # Insurance Allowable Auth Required   In-person 4  []  Yes []  No    Telehealth   []  Yes []  No    Total        Functional Scale: FOTO Neck 48/100 (48%)    Date assessed:  22   Therapy Diagnosis/Practice Pattern: F      Number of Comorbidities:  []0     [x]1-2    []3+     Latex Allergy:  [x]NO      []YES  Preferred Language for Healthcare:   [x]English       []other:    Pain level:  5-7/10     SUBJECTIVE:  Patient reports that his neck is about the same. He saw the doctor and is planning to get an MRI on his neck. States that looking to the right and looking up are the most problematic motions of the neck. Does feel like he is less stiff. OBJECTIVE: Stiff CT junction PA, tender levator scapulae and upper trap with trigger points noted, pain with PA's in thoracic spine.    Observation  Test measurements:  Cervical rotation: L: 50 R: 55 cervical extension 46 pain : Cervical flexion 35    RESTRICTIONS/PRECAUTIONS:     Exercises/Interventions:   Therapeutic Ex        Sets/sec Reps Notes         With light OP       Patient education  10'  HEP, changing positions,  strengthening and mobility, walking, shoulder tensing and overactivation,    Open book stretch  1 10 ea R/L  Painful to the left     RTB ER  2  10    1 10     Manual Intervention      25'     STM to upper trap and rhomboids  8'     PA thoracic and cervical C5-T8 2'  Painful    Passive side glides to left  3'      STM suboccipitals and manual cervical traction 5                 NMR re-education          Chin tucks with rotation  1  10 ea R/L  Pain turning to right    HEP    Chin tucks with shoulder flexion  3\" 20x Max cueing needed for proper form       RTB rows 3\" 2 x 10 Cueing for scap and neck position   RTB ext 3\" 2 x 10    Traction                                 HEP instruction:   Access Code: [email or web address]  URL: [email or web address]  Date: 06/30/2022  Prepared by: Marcella Luciano    Therapeutic Exercise and NMR EXR  [x] (05453) Provided verbal/tactile cueing for activities related to strengthening, flexibility, endurance, ROM  for improvements in cervical, postural, scapular, scapulothoracic and UE control with self care, reaching, carrying, lifting, house/yardwork, driving/computer work. [x] (53595) Provided verbal/tactile cueing for activities related to improving balance, coordination, kinesthetic sense, posture, motor skill, proprioception  to assist with cervical, scapular, scapulothoracic and UE control with self care, reaching, carrying, lifting, house/yardwork, driving/computer work.     Therapeutic Activities:    [] (49706 or 40118) Provided verbal/tactile cueing for activities related to improving balance, coordination, kinesthetic sense, posture, motor skill, proprioception and motor activation to allow for proper function of cervical, scapular, scapulothoracic and UE control with self care, carrying, lifting, Deficits. [x] Progressing: [] Met: [] Not Met: [] Adjusted    Long Term Goals: To be achieved in: 8 weeks  1. Disability index score of 69% or more for the FOTO Neck questionnaire to assist with reaching prior level of function. [] Progressing: [] Met: [] Not Met: [] Adjusted  2. Patient will demonstrate increased AROM of bilateral rotation and extension to WNL without pain to allow for proper joint functioning as indicated by patients Functional Deficits. [] Progressing: [] Met: [] Not Met: [] Adjusted  3. Patient will demonstrate an increase in postural awareness and control and activation of  Deep cervical stabilizers to allow for proper functional mobility as indicated by patients Functional Deficits. [] Progressing: [] Met: [] Not Met: [] Adjusted   4. Patient will be able to sleep through the night without increased symptoms or restriction in his neck/back. [] Progressing: [] Met: [] Not Met: [] Adjusted  5. Patient will be able to drive without increased symptoms or restrictions. (patient specific functional goal) [] Progressing: [] Met: [] Not Met: [] Adjusted      Overall Progression Towards Functional goals/ Treatment Progress Update:  [] Patient is progressing as expected towards functional goals listed. [] Progression is slowed due to complexities/Impairments listed. [] Progression has been slowed due to co-morbidities. [x] Plan just implemented, too soon to assess goals progression <30days   [] Goals require adjustment due to lack of progress  [] Patient is not progressing as expected and requires additional follow up with physician  [] Other    Prognosis for POC: [x] Good [] Fair  [] Poor      Patient requires continued skilled intervention: [x] Yes  [] No      ASSESSMENT:  Patient did not tolerate manual PA's to the cervicothoracic/thoracic junction and it began to reproduce symptoms into the shoulder and arm.  Right cervical on thoracic rotation reproduced his symptoms throughout the treatment and so any exercises with rotation were discontinued. Tolerated supine position best and reproduced his symptoms the least. Discussed posture and positioning today as well as being consistent with chin tucks and avoiding painful positions. Treatment/Activity Tolerance:  [x] Patient tolerated treatment well [] Patient limited by fatique  [] Patient limited by pain  [] Patient limited by other medical complications  [] Other:     Prognosis: [] Good [x] Fair  [] Poor    Patient Requires Follow-up: [x] Yes  [] No    PLAN: See eval  [x] Continue per plan of care [] Alter current plan (see comments above)  [] Plan of care initiated [] Hold pending MD visit [] Discharge      Electronically signed by:  Isacc Stephenson, PT, DPT [id number]   NELSON Thomas  Therapist was present, directed the patient's care, made skilled judgement, and was responsible for assessment and treatment of the patient. Note: If patient does not return for scheduled/ recommended follow up visits, this note will serve as a discharge from care along with most recent update on progress.

## 2022-07-28 ENCOUNTER — HOSPITAL ENCOUNTER (OUTPATIENT)
Dept: PHYSICAL THERAPY | Age: 42
Setting detail: THERAPIES SERIES
Discharge: HOME | End: 2022-07-28
Payer: COMMERCIAL

## 2022-07-28 PROCEDURE — 97140 MANUAL THERAPY 1/> REGIONS: CPT | Performed by: PHYSICAL THERAPIST

## 2022-07-28 PROCEDURE — 97112 NEUROMUSCULAR REEDUCATION: CPT | Performed by: PHYSICAL THERAPIST

## 2022-07-28 PROCEDURE — 97110 THERAPEUTIC EXERCISES: CPT | Performed by: PHYSICAL THERAPIST

## 2022-07-28 NOTE — FLOWSHEET NOTE
[facility],  [address]  Phone: [phone]  Fax [phone]      Physical Therapy Treatment Note/ Progress Report:       Date:  2022    Patient Name:  Evelyn Slater    :  1980  MRN: [id number]  Restrictions/Precautions:    Medical/Treatment Diagnosis Information:  Diagnosis: Strain of neck muscle (S16.1XXD), cervical radiculopathy (M54.12)  Treatment Diagnosis: Neck pain (M54.2), thoracic ECAR(I96.6),  Insurance/Certification information:  PT Insurance Information: [facility]  Physician Information:   FRANCESCO Collins  Has the plan of care been signed (Y/N):        [x]  Yes  []  No     Date of Patient follow up with Physician:     Is this a Progress Report:     []  Yes  [x]  No        If Yes:  Date Range for reporting period:  Beginnin22  Ending    Progress report will be due (10 Rx or 30 days whichever is less):        Recertification will be due (POC Duration  / 90 days whichever is less):         Visit # Insurance Allowable Auth Required   In-person 5 60 []  Yes []  No    Telehealth   []  Yes []  No    Total        Functional Scale: FOTO Neck 48/100 (48%)    Date assessed:  22   Therapy Diagnosis/Practice Pattern: F      Number of Comorbidities:  []0     [x]1-2    []3+     Latex Allergy:  [x]NO      []YES  Preferred Language for Healthcare:   [x]English       []other:    Pain level:  5-7/10     SUBJECTIVE:  Patient reports he got his MRI and EMG since last week and that he sees the doctor on Tuesday. He reports that he feels okay with no symptoms radiating into the hands at this time.      OBJECTIVE:     RESTRICTIONS/PRECAUTIONS:     Exercises/Interventions:   Therapeutic Ex        Sets/sec Reps Notes   Quadruped cat/cow 5\" 10x ea No pain when patient does not extend head   Pulleys flexion and ABD  20 3    With light OP   pec stretch in true stretch 20 4x    Lat stretch in true stretch 20\" 4x  Cueing for posture        Patient education  8'  Following up with PA regarding MRI results; cervical spine anatomy and disc hernation   Open book stretch  1 10 ea R/L  Painful to the left     RTB ER  2  10    1 10     Manual Intervention      17'     STM to upper trap and pec  8'      Painful        STM suboccipitals and manual cervical traction  9                 NMR re-education          HEP    Chin tucks  1 10     Chin tucks with shoulder flexion  3\" 10x Max cueing needed for proper form       Prone extension  1  10    Prone HAB on SB 3\" 15x Needs cueing   RTB rows 3\" 2 x 10 Cueing for scap and neck position   Traction                                 HEP instruction:   Access Code: [email or web address]  URL: [email or web address]  Date: 06/30/2022  Prepared by: Lorin Ross    Therapeutic Exercise and NMR EXR  [x] (87903) Provided verbal/tactile cueing for activities related to strengthening, flexibility, endurance, ROM  for improvements in cervical, postural, scapular, scapulothoracic and UE control with self care, reaching, carrying, lifting, house/yardwork, driving/computer work. [x] (93527) Provided verbal/tactile cueing for activities related to improving balance, coordination, kinesthetic sense, posture, motor skill, proprioception  to assist with cervical, scapular, scapulothoracic and UE control with self care, reaching, carrying, lifting, house/yardwork, driving/computer work. Therapeutic Activities:    [] (68636 or 74180) Provided verbal/tactile cueing for activities related to improving balance, coordination, kinesthetic sense, posture, motor skill, proprioception and motor activation to allow for proper function of cervical, scapular, scapulothoracic and UE control with self care, carrying, lifting, driving/computer work.      Home Exercise Program:    [x] (35862) Reviewed/Progressed HEP activities related to strengthening, flexibility, endurance, ROM of cervical, scapular, scapulothoracic and UE control with self care, reaching, carrying, lifting, house/yardwork, driving/computer work  [] (44843) Reviewed/Progressed HEP activities related to improving balance, coordination, kinesthetic sense, posture, motor skill, proprioception of cervical, scapular, scapulothoracic and UE control with self care, reaching, carrying, lifting, house/yardwork, driving/computer work      Manual Treatments:  PROM / STM / Oscillations-Mobs:  G-I, II, III, IV (PA's, Inf., Post.)  [x] (55422) Provided manual therapy to mobilize soft tissue/joints of cervical/CT, scapular GHJ and UE for the purpose of decreasing headache, modulating pain, promoting relaxation,  increasing ROM, reducing/eliminating soft tissue swelling/inflammation/restriction, improving soft tissue extensibility and allowing for proper ROM for normal function with self care, reaching, carrying, lifting, house/yardwork, driving/computer work    Modalities:  None this date    Charges  Timed Code Treatment Minutes: 39'   Total Treatment Minutes: 39'       [] EVAL (LOW) 455 1011   [] EVAL (MOD) 44740   [] EVAL (HIGH) 43400   [] RE-EVAL     [x] SE(11871) x  1   [] IONTO  [x] NMR (58628) x  1   [] VASO  [x] Manual (65279) x  1    [] Other:  [] TA x      [] Mech Traction (67455)  [] ES(attended) (36215)      [] ES (un) (03259):       GOALS:  Patient stated goal: \"Eliminate pain/tension. \"  [] Progressing: [] Met: [] Not Met: [] Adjusted    Therapist goals for Patient:   Short Term Goals: To be achieved in: 2 weeks  1. Independent in HEP and progression per patient tolerance, in order to prevent re-injury. [] Progressing: [x] Met: [] Not Met: [] Adjusted  2. Patient will have a decrease in pain to facilitate improvement in movement, function, and ADLs as indicated by Functional Deficits. [x] Progressing: [] Met: [] Not Met: [] Adjusted    Long Term Goals: To be achieved in: 8 weeks  1.  Disability index score of 69% or more for the FOTO Neck questionnaire to assist with reaching prior level of function. [] Progressing: [] Met: [] Not Met: [] Adjusted  2. Patient will demonstrate increased AROM of bilateral rotation and extension to WNL without pain to allow for proper joint functioning as indicated by patients Functional Deficits. [] Progressing: [] Met: [] Not Met: [] Adjusted  3. Patient will demonstrate an increase in postural awareness and control and activation of  Deep cervical stabilizers to allow for proper functional mobility as indicated by patients Functional Deficits. [] Progressing: [] Met: [] Not Met: [] Adjusted   4. Patient will be able to sleep through the night without increased symptoms or restriction in his neck/back. [] Progressing: [] Met: [] Not Met: [] Adjusted  5. Patient will be able to drive without increased symptoms or restrictions. (patient specific functional goal) [] Progressing: [] Met: [] Not Met: [] Adjusted      Overall Progression Towards Functional goals/ Treatment Progress Update:  [] Patient is progressing as expected towards functional goals listed. [] Progression is slowed due to complexities/Impairments listed. [] Progression has been slowed due to co-morbidities. [x] Plan just implemented, too soon to assess goals progression <30days   [] Goals require adjustment due to lack of progress  [] Patient is not progressing as expected and requires additional follow up with physician  [] Other    Prognosis for POC: [x] Good [] Fair  [] Poor      Patient requires continued skilled intervention: [x] Yes  [] No      ASSESSMENT: Focused on more manuals and symptom reduction during today's treatment session. Patient able to tolerate cat/cow and some neck rotation without pain today. Will continue to decrease tightness in UE and neck musculature with manuals, mobility exercises,  and stretching.      Treatment/Activity Tolerance:  [x] Patient tolerated treatment well [] Patient limited by fatique  [] Patient limited by pain  [] Patient limited by other medical complications  [] Other:     Prognosis: [] Good [x] Fair  [] Poor    Patient Requires Follow-up: [x] Yes  [] No    PLAN: See eval  [x] Continue per plan of care [] Alter current plan (see comments above)  [] Plan of care initiated [] Hold pending MD visit [] Discharge      Electronically signed by:  Adolfo Kohler PT, DPT [id number]   Walker Emanuel, SPT  Therapist was present, directed the patient's care, made skilled judgement, and was responsible for assessment and treatment of the patient. Note: If patient does not return for scheduled/ recommended follow up visits, this note will serve as a discharge from care along with most recent update on progress.

## 2022-08-01 ENCOUNTER — OFFICE VISIT (OUTPATIENT)
Dept: ORTHOPEDIC SURGERY | Age: 42
End: 2022-08-01
Payer: COMMERCIAL

## 2022-08-01 VITALS — WEIGHT: 179 LBS | HEIGHT: 70 IN | BODY MASS INDEX: 25.62 KG/M2

## 2022-08-01 DIAGNOSIS — M50.00 HERNIATED NUCLEUS PULPOSUS WITH MYELOPATHY, CERVICAL: ICD-10-CM

## 2022-08-01 DIAGNOSIS — M54.12 CERVICAL RADICULOPATHY: ICD-10-CM

## 2022-08-01 DIAGNOSIS — S16.1XXA STRAIN OF NECK MUSCLE, INITIAL ENCOUNTER: Primary | ICD-10-CM

## 2022-08-01 PROCEDURE — 99214 OFFICE O/P EST MOD 30 MIN: CPT | Performed by: PHYSICIAN ASSISTANT

## 2022-08-01 NOTE — PROGRESS NOTES
Medications on File Prior to Visit   Medication Sig Dispense Refill    methylPREDNISolone (MEDROL, VITOR,) 4 MG tablet Take by mouth. 1 kit 0    predniSONE (DELTASONE) 10 MG tablet Take 1 tablet three times a day for 7 days, take 1 tablet twice a day for 7 days, then take Medrol Dosepak 35 tablet 0    pantoprazole (PROTONIX) 40 MG tablet Take 40 mg by mouth every morning (before breakfast)       No current facility-administered medications on file prior to visit. ASCVD 10-YEAR RISK SCORE  The 10-year ASCVD risk score (Radha Davidson et al., 2013) is: 2%    Values used to calculate the score:      Age: 42 years      Sex: Male      Is Non- : No      Diabetic: No      Tobacco smoker: No      Systolic Blood Pressure: 835 mmHg      Is BP treated: No      HDL Cholesterol: 54 mg/dL      Total Cholesterol: 279 mg/dL     Review of Systems  10-point ROS is negative other than HPI. Physical Exam  Based off 1997 Exam Criteria  Ht 5' 10\" (1.778 m)   Wt 179 lb (81.2 kg)   BMI 25.68 kg/m²      Constitutional:       General: He is not in acute distress. Appearance: Normal appearance. Cardiovascular:      Rate and Rhythm: Normal rate and regular rhythm. Pulses: Normal pulses. Pulmonary:      Effort: Pulmonary effort is normal. No respiratory distress. Neurological:      Mental Status: He is alert and oriented to person, place, and time. Mental status is at baseline.      Musculoskeletal:  Gait:  normal    Cervical Spine / Shoulder:      RIGHT  LEFT    Cervical Spine Exam  [x] All Neg    [] All Neg     Spurling's  []  []Not tested   [x]  []Not tested    Moreno's  []  []Not tested   []  []Not tested    Pain with rotation  []  []Not tested   [x]  []Not tested    Pain with lateral bending  []  []Not tested   [x]  []Not tested    Paraspinal muscle tenderness  [] Paraspinal  []Midline   [x] Paraspinal  []Not tested    Sensation RIGHT  LEFT    Axillary  [x] Normal []Decreased    [x] Normal []Decreased   Musculocutaneous  [x] Normal  []Decreased   [x] Normal []Decreased   Median  [x] Normal []Decreased   [x] Normal []Decreased   Radial  [x] Normal  []Decreased   [x] Normal []Decreased   Ulnar  [x] Normal  []Decreased   [x] Normal []Decreased   Scapula       Position  [x]Nml  []low  [] lateral  [x]Nml  []low  [] lateral   Dyskinesia  []+ []Abn. Shrug   []+ []Abn. Shrug                     Winging     [x]None   []Med  []Lat   []Worse w/FE  []Med  []Lat  []Worse w/FE   Scapulothoracic Compress.    []Impr Pain  []Impr Motion  []Impr Pain []Impr Motion    Range of Motion Active Passive Active Passive   Forward Elevation 170  170    Abduction 100  100    External Rotation @ side 60  60    External Rotation @ 90 abd 90  90    Internal Rotation @ 90 abd 40  40    Internal Rotation Normal  Normal    End range of motion  [] Pain  [] Pain  [] Pain  [] Pain   Strength RIGHT /5 LEFT /5   Abduction 5  5    External Rotation 5  5    Internal Rotation 5  5    Provocative Signs/Tests  [] All Neg   [x] +      [] -  [] All Neg   [x] +      [] -   Rotator Cuff Signs  [x] All Neg  [] Not tested   [x] All Neg  [] Not tested    Neer  []  []Not tested   []  []Not tested    Hilda Cherry  []  []Not tested   []  []Not tested    Painful arc  []  []Not tested   []  []Not tested    Greater tuberosity tenderness  []  []Not tested   []  []Not tested    Drop arm  []  []Not tested  []  []Not tested   Superior Escape  []  []Not tested   []  []Not tested    ER Lag  []  []Not tested   []  []Not tested    Belly press  []  []Not tested   []  []Not tested    Lift-off  []  []Not tested   []  []Not tested    Bear hug  []  []Not tested   []  []Not tested    Biceps/Labral Signs  [x] All Neg  [] Not tested   [x] All Neg  [] Not tested    Hagen's  []  []Not tested   []  []Not tested    Speed's  []  []Not tested   []  []Not tested    Dynamic Load Shift/Shear  []  []Not tested   []  []Not tested    Clicking/Popping  []  []Not tested  []  []Not tested Bicipital groove tenderness  []  []Not tested   []  []Not tested    Edward  []  []Not tested   []  []Not tested    [facility] Joint Signs  [x] All Neg  [] Not tested   [x] All Neg  [] Not tested    [facility] joint tenderness  []  []Not tested   []  []Not tested    Cross-arm adduction pain  []  []Not tested   []  []Not tested    Instability Signs  [] All Neg  [] Not tested  [] All Neg  [] Not tested   General laxity (thumb/elbow)  []  []Not tested   []  []Not tested    Hyperabduction  []  []Not tested   []  []Not tested    Sulcus []Side   []ER    []Side   []ER       Anterior apprehension  []  []Not tested   []  []Not tested    Relocation  []   []Not tested  []  []Not tested     Imaging  Left Shoulder: [address] [place] [address],[address]  Previous Radiographs: X-rays were ordered today and reviewed of the left shoulder. 3 views. AP, scapular Y, and axillary views. They demonstrate no evidence of fractures or dislocations with well-maintained joint space. Site: [facility] #: [id number] #: [id number] Procedure: MR Cervical Spine w/o Contrast ; Reason for Exam: stain of neck muscle; cervical radiculopathy; evaluate foraminal stenosis   This document is confidential medical information. Unauthorized disclosure or use of this information is prohibited by law. If you are not the intended recipient of this document, please advise us by calling immediately [phone]. [facility]   [address]           Patient Name: Anant Mancera   Case ID: [id number]   Patient : 1980   Referring Physician: Brijesh Raza, [facility]   Exam Date: 2022   Exam Description: MR Cervical Spine w/o Contrast            HISTORY:  Strain of neck muscle; cervical radiculopathy; evaluate foraminal stenosis. TECHNICAL FACTORS:  Long- and short-axis fat- and water-weighted images were performed. COMPARISON:  None. FINDINGS:  C2-3:  No focal protrusion or stenosis.        C3-4:  No focal protrusion or stenosis. C4-5:  No focal protrusion or stenosis. C5-6:  2 mm central protrusion-type herniation and annular tear left of midline. C6-7:  Central leftward cranially directed 1.1 cm extrusion-type herniation extends into the    preforaminal region abutting the cord and exiting nerve. C7-T1:  No focal protrusion or stenosis. Otherwise, the craniocervical junction, cord, prevertebral soft tissues and ligaments are    unremarkable. CONCLUSION:   C6-7 central leftward cranially directed 1.1 cm extrusion-type herniation extends into the    preforaminal region abutting the cord and exiting nerve. Personal review of CT scan does demonstrate a C6-C7 herniated disc compressing the exiting nerve root. No evidence of fractures or spondylolisthesis. Personal review of EMG demonstrates no evidence of carpal tunnel syndrome, cubital tunnel syndrome, cervical radiculopathy or polyneuropathy. Procedure:  No orders of the defined types were placed in this encounter. Assessment and Plan  Bonilla Aid was seen today for results. Diagnoses and all orders for this visit:    Strain of neck muscle, initial encounter    Cervical radiculopathy    Herniated nucleus pulposus with myelopathy, cervical        Patient's symptoms are arising from his cervical spine. Patient's shoulder is doing better. He would benefit from spine consultation. We have discussed epidural injections versus surgical intervention. I think he would benefit from epidural injection before considering surgical options. Patient will continue on mobic. F/U on a prn basis with me.     I discussed with Connie Barcenas that his history, symptoms, signs and imaging are most consistent with cervical strain, HNP, and radiculopathy    We reviewed the natural history of these conditions and treatment options ranging from conservative measures (rest, icing, activity modification, physical therapy, pain meds, cortisone injection) to surgical options. In terms of treatment, I recommended continuing with rest, icing, avoidance of painful activities, NSAIDs or pain meds as tolerated, and physical therapy. If these are not effective, cortisone injection can be considered. We discussed surgical options as well, should conservative measures fail. Electronically signed by Kiya Crawley PA-C on 8/1/2022 at 3:05 PM  This dictation was generated by voice recognition computer software. Although all attempts are made to edit the dictation for accuracy, there may be errors in the transcription that are not intended.

## 2022-08-04 ENCOUNTER — HOSPITAL ENCOUNTER (OUTPATIENT)
Dept: PHYSICAL THERAPY | Age: 42
Setting detail: THERAPIES SERIES
Discharge: HOME | End: 2022-08-04
Payer: COMMERCIAL

## 2022-08-04 PROCEDURE — 97110 THERAPEUTIC EXERCISES: CPT | Performed by: PHYSICAL THERAPIST

## 2022-08-04 PROCEDURE — 97140 MANUAL THERAPY 1/> REGIONS: CPT | Performed by: PHYSICAL THERAPIST

## 2022-08-04 NOTE — FLOWSHEET NOTE
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),  Insurance/Certification information:  PT Insurance Information: [facility]  Physician Information:   FRANCESCO Rodriguez  Has the plan of care been signed (Y/N):        [x]  Yes  []  No     Date of Patient follow up with Physician:     Is this a Progress Report:     []  Yes  [x]  No        If Yes:  Date Range for reporting period:  Beginnin22  Ending    Progress report will be due (10 Rx or 30 days whichever is less):        Recertification will be due (POC Duration  / 90 days whichever is less):         Visit # Insurance Allowable Auth Required   In-person 6 60 []  Yes []  No    Telehealth   []  Yes []  No    Total        Functional Scale: FOTO Neck 48/100 (48%)    Date assessed:  22   Therapy Diagnosis/Practice Pattern: F      Number of Comorbidities:  []0     [x]1-2    []3+     Latex Allergy:  [x]NO      []YES  Preferred Language for Healthcare:   [x]English       []other:    Pain level:  5-7/10     SUBJECTIVE:  Patient reports his neck may be doing a little better, but it is hard to tell if he is just avoiding painful positions more. Seeing the spine PA tomorrow.      OBJECTIVE:   Test Measurements:    RESTRICTIONS/PRECAUTIONS:     Exercises/Interventions:   Therapeutic Ex        Sets/sec Reps Notes   No pain when patient does not extend head      With light OP   pec stretch in true stretch 20 4x    Lat stretch in true stretch 20\" 4x  Cueing for posture        Patient education  5'  Disc herniations and possible treatment, continued focus on posture and reducing compression on  spine   Painful to the left        Standing bilateral upper rotation with neutral c-spine  1 10     Manual Intervention           SOR, STM upper trap, IASTM around CT junction musculature  10'     PA T spine 5'  Painful    Cervical side glides 5'           Manual traction 5'             NMR re-education          HEP    Chin tucks  1 10     Chin tucks with shoulder flexion  3\" 10x Max cueing needed for proper form       Prone extension  1  10    Prone HAB on SB 3\" 10x Needs cueing   Cueing for scap and neck position   Traction                                 HEP instruction:   Access Code: [email or web address]  URL: [email or web address]  Date: 06/30/2022  Prepared by: Missy Woodruff    Therapeutic Exercise and NMR EXR  [x] (44063) Provided verbal/tactile cueing for activities related to strengthening, flexibility, endurance, ROM  for improvements in cervical, postural, scapular, scapulothoracic and UE control with self care, reaching, carrying, lifting, house/yardwork, driving/computer work. [x] (09873) Provided verbal/tactile cueing for activities related to improving balance, coordination, kinesthetic sense, posture, motor skill, proprioception  to assist with cervical, scapular, scapulothoracic and UE control with self care, reaching, carrying, lifting, house/yardwork, driving/computer work. Therapeutic Activities:    [] (02674 or 51702) Provided verbal/tactile cueing for activities related to improving balance, coordination, kinesthetic sense, posture, motor skill, proprioception and motor activation to allow for proper function of cervical, scapular, scapulothoracic and UE control with self care, carrying, lifting, driving/computer work.      Home Exercise Program:    [x] (77470) Reviewed/Progressed HEP activities related to strengthening, flexibility, endurance, ROM of cervical, scapular, scapulothoracic and UE control with self care, reaching, carrying, lifting, house/yardwork, driving/computer work  [] (50299) Reviewed/Progressed HEP activities related to improving balance, coordination, kinesthetic sense, posture, motor skill, proprioception of cervical, scapular, scapulothoracic and UE control with self care, reaching, carrying, lifting, house/yardwork, driving/computer work      Manual Treatments:  PROM / STM / Oscillations-Mobs:  G-I, II, III, IV (PA's, Inf., Post.)  [x] (11852) Provided manual therapy to mobilize soft tissue/joints of cervical/CT, scapular GHJ and UE for the purpose of decreasing headache, modulating pain, promoting relaxation,  increasing ROM, reducing/eliminating soft tissue swelling/inflammation/restriction, improving soft tissue extensibility and allowing for proper ROM for normal function with self care, reaching, carrying, lifting, house/yardwork, driving/computer work    Modalities:  None this date    Charges  Timed Code Treatment Minutes: 39'   Total Treatment Minutes: 39'       [] EVAL (LOW) 455 1011   [] EVAL (MOD) 38070   [] EVAL (HIGH) 80698   [] RE-EVAL     [x] FG(05447) x  1   [] IONTO  [] NMR (65892) x     [] VASO  [x] Manual (95982) x  2    [] Other:  [] TA x      [] Mech Traction (78463)  [] ES(attended) (34101)      [] ES (un) (21901):       GOALS:  Patient stated goal: \"Eliminate pain/tension. \"  [] Progressing: [] Met: [] Not Met: [] Adjusted    Therapist goals for Patient:   Short Term Goals: To be achieved in: 2 weeks  1. Independent in HEP and progression per patient tolerance, in order to prevent re-injury. [] Progressing: [x] Met: [] Not Met: [] Adjusted  2. Patient will have a decrease in pain to facilitate improvement in movement, function, and ADLs as indicated by Functional Deficits. [x] Progressing: [] Met: [] Not Met: [] Adjusted    Long Term Goals: To be achieved in: 8 weeks  1.  Disability index score of 69% or more for the FOTO Neck questionnaire to assist with reaching prior level of function. [] Progressing: [] Met: [] Not Met: [] Adjusted  2. Patient will demonstrate increased AROM of bilateral rotation and extension to WNL without pain to allow for proper joint functioning as indicated by patients Functional Deficits. [] Progressing: [] Met: [] Not Met: [] Adjusted  3. Patient will demonstrate an increase in postural awareness and control and activation of  Deep cervical stabilizers to allow for proper functional mobility as indicated by patients Functional Deficits. [] Progressing: [] Met: [] Not Met: [] Adjusted   4. Patient will be able to sleep through the night without increased symptoms or restriction in his neck/back. [] Progressing: [] Met: [] Not Met: [] Adjusted  5. Patient will be able to drive without increased symptoms or restrictions. (patient specific functional goal) [] Progressing: [] Met: [] Not Met: [] Adjusted      Overall Progression Towards Functional goals/ Treatment Progress Update:  [] Patient is progressing as expected towards functional goals listed. [] Progression is slowed due to complexities/Impairments listed. [] Progression has been slowed due to co-morbidities. [x] Plan just implemented, too soon to assess goals progression <30days   [] Goals require adjustment due to lack of progress  [] Patient is not progressing as expected and requires additional follow up with physician  [] Other    Prognosis for POC: [x] Good [] Fair  [] Poor      Patient requires continued skilled intervention: [x] Yes  [] No      ASSESSMENT:  Patient responded well to manuals this date. Demonstrates continued tightness in T spine. Less irritation and no provocation of symptoms during session today. Will continue pending visit with spine PA.      Treatment/Activity Tolerance:  [x] Patient tolerated treatment well [] Patient limited by fatique  [] Patient limited by pain  [] Patient limited by other medical complications  [] Other:     Prognosis: [] Good [x] Fair  [] Poor    Patient Requires Follow-up: [x] Yes  [] No    PLAN: See eval  [x] Continue per plan of care [] Alter current plan (see comments above)  [] Plan of care initiated [] Hold pending MD visit [] Discharge      Electronically signed by:  Jean-Pierre Matos PT, DPT [id number]     Note: If patient does not return for scheduled/ recommended follow up visits, this note will serve as a discharge from care along with most recent update on progress.

## 2022-08-05 ENCOUNTER — OFFICE VISIT (OUTPATIENT)
Dept: ORTHOPEDIC SURGERY | Age: 42
End: 2022-08-05
Payer: COMMERCIAL

## 2022-08-05 VITALS — HEIGHT: 70 IN | BODY MASS INDEX: 25.62 KG/M2 | WEIGHT: 179 LBS

## 2022-08-05 DIAGNOSIS — M50.00 HNP (HERNIATED NUCLEUS PULPOSUS) WITH MYELOPATHY, CERVICAL: ICD-10-CM

## 2022-08-05 DIAGNOSIS — M54.12 CERVICAL RADICULOPATHY: Primary | ICD-10-CM

## 2022-08-05 DIAGNOSIS — M54.12 RADICULITIS OF LEFT CERVICAL REGION: ICD-10-CM

## 2022-08-05 PROCEDURE — 99214 OFFICE O/P EST MOD 30 MIN: CPT | Performed by: PHYSICIAN ASSISTANT

## 2022-08-05 RX ORDER — MELOXICAM 15 MG/1
15 TABLET ORAL DAILY PRN
Qty: 30 TABLET | Refills: 1 | Status: SHIPPED | OUTPATIENT
Start: 2022-08-05

## 2022-08-05 NOTE — PROGRESS NOTES
New Patient: SPINE    8/5/2022     CHIEF COMPLAINT:    Chief Complaint   Patient presents with    Neck Pain     Neck pain  x 2 months after trying to start a . Radiating pain down left arm. EMG,PT,MRI and XR       HISTORY OF PRESENT ILLNESS:              The patient is a 42 y.o. male referred by Mike Ram PA-C for neck and radiating left arm pain. He reports a history of chronic episodic underlying neck pain and a 2-month history of radiating aching and stabbing left scapular pain extending into the triceps. At times he will also experience some left upper extremity tingling. His pain is constant his symptoms are increased with cervical extension. He reports some relief with standing or walking. Conservative care includes PT, oral steroids, chiropractics, massage, NSAIDs. He does report some improvement at this time but still with daily symptoms. He denies any upper extremity weakness. He denies any contralateral radiating pain. He denies any fine motor difficulty or gait instability. Denies any chest pain or dyspnea. Denies any direct injury or trauma. Denies any recent fevers chills or infections. Past Medical History:   Diagnosis Date    Paez syndrome       Pain Assessment  Location of Pain: Neck  Location Modifiers: Left  Severity of Pain: 7  Quality of Pain: Throbbing, Dull, Aching, Sharp  Duration of Pain: Persistent  Frequency of Pain: Intermittent  Aggravating Factors: Other (Comment)  Limiting Behavior: Yes  Result of Injury: No  Work-Related Injury: No  Are there other pain locations you wish to document?: No    The pain assessment was noted & reviewed in the medical record today.      Current/Past Treatment:   Physical Therapy: Yes  Chiropractic:     Injection:     Medications:            NSAIDS: Yes            Muscle relaxer: Flexeril            Steriods: Prednisone, MDP            Neuropathic medications:              Opioids:            Other:   Surgery/Consult: No    Work Status/Functionality:     Past Medical History: Medical history form was reviewed today & scanned into the media tab  Past Medical History:   Diagnosis Date    Paez syndrome       Past Surgical History:     Past Surgical History:   Procedure Laterality Date    KNEE ARTHROPLASTY Right 2011    NASAL SEPTUM SURGERY  2010     Current Medications:     Current Outpatient Medications:     methylPREDNISolone (MEDROL, VITOR,) 4 MG tablet, Take by mouth., Disp: 1 kit, Rfl: 0    predniSONE (DELTASONE) 10 MG tablet, Take 1 tablet three times a day for 7 days, take 1 tablet twice a day for 7 days, then take Medrol Dosepak, Disp: 35 tablet, Rfl: 0    pantoprazole (PROTONIX) 40 MG tablet, Take 40 mg by mouth every morning (before breakfast), Disp: , Rfl:   Allergies:  Patient has no known allergies. Social History:    reports that he has never smoked. He has never used smokeless tobacco. He reports current alcohol use. He reports that he does not use drugs. Family History:   Family History   Problem Relation Age of Onset    Breast Cancer Mother     Other Mother         paez syndrome    Other Father         dementia    Stroke Father     Heart Attack Maternal Grandfather     Heart Attack Paternal Grandfather        REVIEW OF SYSTEMS: Full ROS reviewed & scanned into chart  CONSTITUTIONAL: Denies unexplained weight loss, fevers   SKIN: Denies active skin conditions   ENT: Denies dizziness, nosebleeds  RESPIRATORY: Denies current dyspnea, difficulty breathing  CARDIOVASCULAR: Denies chest pain   NEUROLOGICAL: Denies stroke, unsteady gait or progressive weakness  PSYCHOLOGICAL: Denies anxiety, depression   HEMATOLOGIC: Denies blood disorders, cancer  ENDOCRINE: Denies excessive thirst, urination, heat/cold  GI: Denies ulcer, nausea, vomiting, diarrhea   : Denies bowel or bladder incontinence       PHYSICAL EXAM:    Vitals: Height 5' 10\" (1.778 m), weight 179 lb (81.2 kg).   Pain score 8/10    GENERAL EXAM:  General Apparence: Patient is adequately groomed with no evidence of malnutrition. Orientation: The patient is oriented to time, place and person. Mood & Affect:The patient's mood and affect are appropriate   Vascular: Examination reveals no swelling tenderness in upper or lower extremities. Good capillary refill  Lymphatic: The lymphatic examination bilaterally reveals all areas to be without enlargement or induration  Sensation: Sensation is intact without deficit  Coordination/Balance: Good coordination     CERVICAL EXAMINATION:  Inspection: Local inspection shows no step-off or bruising. Cervical alignment is normal.     Palpation: Left trap tightness. No focal tenderness at midline  Range of Motion: Intact flexion moderate loss extension extension reproduces symptoms  Strength: 5/5 bilateral upper extremities   Special Tests:      Spurling's positive on the left, L'Hermitte's & Moreno's negative bilaterally. Cubital and Carpal tunnel Tinel's negative bilaterally. Skin:There are no rashes, ulcerations or lesions in right & left upper extremities. Reflexes: Bilaterally triceps, biceps and brachioradialis are 1-2+. Clonus absent bilaterally at the feet. Additional Examinations:       RIGHT UPPER EXTREMITY:  Inspection/examination of the right upper extremity does not show any tenderness, deformity or injury. Range of motion is full. There is no gross instability. There are no rashes, ulcerations or lesions. Strength and tone are normal.  LEFT UPPER EXTREMITY: Inspection/examination of the left upper extremity does not show any tenderness, deformity or injury. Range of motion is full. There is no gross instability. There are no rashes, ulcerations or lesions. Strength and tone are normal.      Diagnostic Testing:    Cervical MRI scan report independently reviewed July 2022 showing a left paracentral disc extrusion at C6-7. No cord signal changes.     Left upper extremity EMG July 2022 is

## 2022-08-11 ENCOUNTER — HOSPITAL ENCOUNTER (OUTPATIENT)
Dept: PHYSICAL THERAPY | Age: 42
Setting detail: THERAPIES SERIES
Discharge: HOME | End: 2022-08-11
Payer: COMMERCIAL

## 2022-08-11 PROCEDURE — 97110 THERAPEUTIC EXERCISES: CPT | Performed by: PHYSICAL THERAPIST

## 2022-08-11 PROCEDURE — 97140 MANUAL THERAPY 1/> REGIONS: CPT | Performed by: PHYSICAL THERAPIST

## 2022-08-11 NOTE — FLOWSHEET NOTE
[facility], [address]  Phone: [phone]  Fax [phone]      Physical Therapy Treatment Note/ Progress Report:       Date:  2022    Patient Name:  Ludmila Levy    :  1980  MRN: [id number]  Restrictions/Precautions:    Medical/Treatment Diagnosis Information:  Diagnosis: Strain of neck muscle (S16.1XXD), cervical radiculopathy (M54.12)  Treatment Diagnosis: Neck pain (M54.2), thoracic HCRO(P67.8),  Insurance/Certification information:  PT Insurance Information: [employer]  Physician Information:   FRANCESCO Hernandez  Has the plan of care been signed (Y/N):        [x]  Yes  []  No     Date of Patient follow up with Physician:     Is this a Progress Report:     []  Yes  [x]  No        If Yes:  Date Range for reporting period:  Beginnin22  Ending    Progress report will be due (10 Rx or 30 days whichever is less):        Recertification will be due (POC Duration  / 90 days whichever is less):         Visit # Insurance Allowable Auth Required   In-person 6 60 []  Yes []  No    Telehealth   []  Yes []  No    Total        Functional Scale: FOTO Neck 48/100 (48%)    Date assessed:  22   Therapy Diagnosis/Practice Pattern: F      Number of Comorbidities:  []0     [x]1-2    []3+     Latex Allergy:  [x]NO      []YES  Preferred Language for Healthcare:   [x]English       []other:    Pain level:  0-7/10     SUBJECTIVE:  Patient reports his neck seems like it has gotten less tight in the past 2 weeks. Saw PA who said he probably does not need surgery at this point, but recommended an injection.      OBJECTIVE:   Test Measurements: Progress note NV    RESTRICTIONS/PRECAUTIONS:     Exercises/Interventions:   Therapeutic Ex        Sets/sec Reps Notes   No pain when patient does not extend head      With light OP   No $ YTB  5\" 20x pec stretch in true stretch 20 4x        Patient education  5'  Injection for spine,  posture   Painful to the left           Manual Intervention           SOR, STM upper trap, IASTM around CT junction musculature  10'     PA T spine 5'  Painful    Cervical side glides 5'           Manual traction 5'             NMR re-education          HEP       Chin tucks with shoulder flexion  3\" 10x Cueing to perform slowly            Prone extension on SB 1  15    Prone HAB on SB 3\" 15x Needs cueing   Cueing for scap and neck position   Traction                                 HEP instruction:   Access Code: [email or web address]  URL: [email or web address]  Date: 06/30/2022  Prepared by: Stef Earl    Therapeutic Exercise and NMR EXR  [x] (77672) Provided verbal/tactile cueing for activities related to strengthening, flexibility, endurance, ROM  for improvements in cervical, postural, scapular, scapulothoracic and UE control with self care, reaching, carrying, lifting, house/yardwork, driving/computer work. [x] (05087) Provided verbal/tactile cueing for activities related to improving balance, coordination, kinesthetic sense, posture, motor skill, proprioception  to assist with cervical, scapular, scapulothoracic and UE control with self care, reaching, carrying, lifting, house/yardwork, driving/computer work. Therapeutic Activities:    [] (23138 or 40643) Provided verbal/tactile cueing for activities related to improving balance, coordination, kinesthetic sense, posture, motor skill, proprioception and motor activation to allow for proper function of cervical, scapular, scapulothoracic and UE control with self care, carrying, lifting, driving/computer work.      Home Exercise Program:    [x] (18940) Reviewed/Progressed HEP activities related to strengthening, flexibility, endurance, ROM of cervical, scapular, scapulothoracic and UE control with self care, reaching, carrying, lifting, house/yardwork, driving/computer work  [] (68053) Reviewed/Progressed HEP activities related to improving balance, coordination, kinesthetic sense, posture, motor skill, proprioception of cervical, scapular, scapulothoracic and UE control with self care, reaching, carrying, lifting, house/yardwork, driving/computer work      Manual Treatments:  PROM / STM / Oscillations-Mobs:  G-I, II, III, IV (PA's, Inf., Post.)  [x] (83494) Provided manual therapy to mobilize soft tissue/joints of cervical/CT, scapular GHJ and UE for the purpose of decreasing headache, modulating pain, promoting relaxation,  increasing ROM, reducing/eliminating soft tissue swelling/inflammation/restriction, improving soft tissue extensibility and allowing for proper ROM for normal function with self care, reaching, carrying, lifting, house/yardwork, driving/computer work    Modalities:  None this date    Charges  Timed Code Treatment Minutes: 37'   Total Treatment Minutes: 37'       [] EVAL (LOW) 69273   [] EVAL (MOD) 00486   [] EVAL (HIGH) 57018   [] RE-EVAL     [x] TC(94994) x  1   [] IONTO  [] NMR (93911) x     [] VASO  [x] Manual (99141) x  2    [] Other:  [] TA x      [] Mech Traction (01735)  [] ES(attended) (80878)      [] ES (un) (30867):       GOALS:  Patient stated goal: \"Eliminate pain/tension. \"  [] Progressing: [] Met: [] Not Met: [] Adjusted    Therapist goals for Patient:   Short Term Goals: To be achieved in: 2 weeks  1. Independent in HEP and progression per patient tolerance, in order to prevent re-injury. [] Progressing: [x] Met: [] Not Met: [] Adjusted  2. Patient will have a decrease in pain to facilitate improvement in movement, function, and ADLs as indicated by Functional Deficits. [x] Progressing: [] Met: [] Not Met: [] Adjusted    Long Term Goals: To be achieved in: 8 weeks  1. Disability index score of 69% or more for the FOTO Neck questionnaire to assist with reaching prior level of function. [] Progressing: [] Met: [] Not Met: [] Adjusted  2.  Patient will demonstrate increased AROM of bilateral rotation and extension to WNL without pain to allow for proper joint functioning as indicated by patients Functional Deficits. [] Progressing: [] Met: [] Not Met: [] Adjusted  3. Patient will demonstrate an increase in postural awareness and control and activation of  Deep cervical stabilizers to allow for proper functional mobility as indicated by patients Functional Deficits. [] Progressing: [] Met: [] Not Met: [] Adjusted   4. Patient will be able to sleep through the night without increased symptoms or restriction in his neck/back. [] Progressing: [] Met: [] Not Met: [] Adjusted  5. Patient will be able to drive without increased symptoms or restrictions. (patient specific functional goal) [] Progressing: [] Met: [] Not Met: [] Adjusted      Overall Progression Towards Functional goals/ Treatment Progress Update:  [] Patient is progressing as expected towards functional goals listed. [] Progression is slowed due to complexities/Impairments listed. [] Progression has been slowed due to co-morbidities. [x] Plan just implemented, too soon to assess goals progression <30days   [] Goals require adjustment due to lack of progress  [] Patient is not progressing as expected and requires additional follow up with physician  [] Other    Prognosis for POC: [x] Good [] Fair  [] Poor      Patient requires continued skilled intervention: [x] Yes  [] No      ASSESSMENT:  Patient demonstrates less tightness in neck musculature this date. Continues to have most restriction at CT junction. Progressing slowly towards goals.      Treatment/Activity Tolerance:  [x] Patient tolerated treatment well [] Patient limited by fatique  [] Patient limited by pain  [] Patient limited by other medical complications  [] Other:     Prognosis: [] Good [x] Fair  [] Poor    Patient Requires Follow-up: [x] Yes  [] No    PLAN: See eval  [x] Continue per plan of care [] Alter current plan (see comments above)  [] Plan of care initiated [] Hold pending MD visit [] Discharge      Electronically signed by:  Isacc Stephenson, PT, DPT [id number]     Note: If patient does not return for scheduled/ recommended follow up visits, this note will serve as a discharge from care along with most recent update on progress.

## 2022-08-18 ENCOUNTER — HOSPITAL ENCOUNTER (OUTPATIENT)
Dept: PHYSICAL THERAPY | Age: 42
Setting detail: THERAPIES SERIES
Discharge: HOME | End: 2022-08-18
Payer: COMMERCIAL

## 2022-08-18 PROCEDURE — 97110 THERAPEUTIC EXERCISES: CPT

## 2022-08-18 PROCEDURE — 97140 MANUAL THERAPY 1/> REGIONS: CPT

## 2022-08-18 NOTE — FLOWSHEET NOTE
[facility], [address] [place] [address]  Phone: [phone]  Fax [phone]      Physical Therapy Treatment Note/ Progress Report:     Physical Therapy Re-Certification Plan of Care/MD UPDATE    Dear Bird Azevedo,    We had the pleasure of treating the following patient for physical therapy services at [address] [place] [address]. A summary of our findings can be found in the updated assessment below. This includes our plan of care. If you have any questions or concerns regarding these findings, please do not hesitate to contact me at the office phone number checked above.   Thank you for the referral.     Physician Signature:________________________________Date:__________________  By signing above (or electronic signature), therapists plan is approved by physician    Date Range Of Visits: 22-22  Total Visits to Date: 8  Overall Response to Treatment:   [x]Patient is responding well to treatment and improvement is noted with regards  to goals   []Patient should continue to improve in reasonable time if they continue HEP   []Patient has plateaued and is no longer responding to skilled PT intervention    []Patient is getting worse and would benefit from return to referring MD   []Patient unable to adhere to initial POC   []Other:         Date:  2022    Patient Name:  Tawanna Julien    :  1980  MRN: [id number]  Restrictions/Precautions:    Medical/Treatment Diagnosis Information:  Diagnosis: Strain of neck muscle (S16.1XXD), cervical radiculopathy (M54.12)  Treatment Diagnosis: Neck pain (M54.2), thoracic RKNE(I06.0),  Insurance/Certification information:  PT Insurance Information: [facility] [facility]  Physician Information:  Bird Azevedo   Has the plan of care been signed (Y/N):        [x]  Yes  []  No     Date of Patient follow up with Physician:     Is this a Progress Report:     [x]  Yes  []  No If Yes: Date Range for reporting period:  Beginnin22  Endin22    Progress report will be due (10 Rx or 30 days whichever is less):       Recertification will be due (POC Duration  / 90 days whichever is less):  22       Visit # Insurance Allowable Auth Required   In-person 8 60 []  Yes []  No    Telehealth   []  Yes []  No    Total        Functional Scale: FOTO Neck 48/100 (48%)    Date assessed:  22  Functional Scale: NDI  39.9/100      Date assessed:  22  Functional Scale: FOTO Neck 61/100 (61%)    Date assessed:  22  Functional Scale: NDI  20.8/100      Date assessed:  22     Therapy Diagnosis/Practice Pattern: F      Number of Comorbidities:  []0     [x]1-2    []3+     Latex Allergy:  [x]NO      []YES  Preferred Language for Healthcare:   [x]English       []other:    Pain level:  3-5/10     SUBJECTIVE:  Patient reports his neck seems like it has gotten less tight in the past 2 weeks. Patient states that their neck does not get any radiating pain down the R arm however, can feel pain in the back of the neck when looking at end range cervical extension.      OBJECTIVE:   Test Measurements:   CERV ROM       Cervical Flexion 35     Cervical Extension 35 Pain at end range   Cervical SB     Cervical rotation 60 65           ROM Left Right   Shoulder Flex [facility]   Shoulder Abd [facility]   Shoulder ER [facility]   Shoulder IR [facility] WFL           Strength Left Right   Shoulder Flex 5/5 4+/5   Shoulder Scap 5/5 4/5   Shoulder ER 5/5 5/5   Shoulder IR 5/5 5/5       RESTRICTIONS/PRECAUTIONS:     Exercises/Interventions:   Therapeutic Ex        Sets/sec Reps Notes   No pain when patient does not extend head      With light OP   No $ YTB  5\" 30x pec stretch in true stretch 20 4x        Patient education  5'  Injection for spine,  posture   Painful to the left           Manual Intervention           STM upper trap,  occipital release 15'     Cervical side glides 5'     Manual traction 5'             NMR re-education          HEP       Chin tucks 3\" 10x Cueing to perform slowly      Cervical deep neck flexors 3 10 Cuing for not using SCM   Cueing for scap and neck position   Traction                                 HEP instruction:   Access Code: [email or web address]  URL: [email or web address]  Date: 06/30/2022  Prepared by: Nawaf Merritt    Therapeutic Exercise and NMR EXR  [x] (02538) Provided verbal/tactile cueing for activities related to strengthening, flexibility, endurance, ROM  for improvements in cervical, postural, scapular, scapulothoracic and UE control with self care, reaching, carrying, lifting, house/yardwork, driving/computer work. [x] (60410) Provided verbal/tactile cueing for activities related to improving balance, coordination, kinesthetic sense, posture, motor skill, proprioception  to assist with cervical, scapular, scapulothoracic and UE control with self care, reaching, carrying, lifting, house/yardwork, driving/computer work. Therapeutic Activities:    [] (03526 or 88087) Provided verbal/tactile cueing for activities related to improving balance, coordination, kinesthetic sense, posture, motor skill, proprioception and motor activation to allow for proper function of cervical, scapular, scapulothoracic and UE control with self care, carrying, lifting, driving/computer work.      Home Exercise Program:    [x] (82208) Reviewed/Progressed HEP activities related to strengthening, flexibility, endurance, ROM of cervical, scapular, scapulothoracic and UE control with self care, reaching, carrying, lifting, house/yardwork, driving/computer work  [] (33889) Reviewed/Progressed HEP activities related to improving balance, coordination, kinesthetic sense, posture, motor skill, proprioception of cervical, scapular, scapulothoracic and UE control with self care, reaching, carrying, lifting, house/yardwork, driving/computer work      Manual Treatments:  PROM / STM / Oscillations-Mobs:  G-I, II, III, IV (PA's, Inf., Post.)  [x] (47195) Provided manual therapy to mobilize soft tissue/joints of cervical/CT, scapular GHJ and UE for the purpose of decreasing headache, modulating pain, promoting relaxation,  increasing ROM, reducing/eliminating soft tissue swelling/inflammation/restriction, improving soft tissue extensibility and allowing for proper ROM for normal function with self care, reaching, carrying, lifting, house/yardwork, driving/computer work    Modalities:  None this date    Charges  Timed Code Treatment Minutes: 40'   Total Treatment Minutes: 48'       [] EVAL (LOW) 43877   [] EVAL (MOD) 67360   [] EVAL (HIGH) 92063   [] RE-EVAL     [x] KO(94824) x  1   [] IONTO  [] NMR (31344) x 0    [] VASO  [x] Manual (17351) x  2    [] Other:  [] TA x      [] Mech Traction (00007)  [] ES(attended) (61023)      [] ES (un) (94561):       GOALS:  Patient stated goal: \"Eliminate pain/tension. \"  [] Progressing: [] Met: [] Not Met: [] Adjusted    Therapist goals for Patient:   Short Term Goals: To be achieved in: 2 weeks  1. Independent in HEP and progression per patient tolerance, in order to prevent re-injury. [] Progressing: [x] Met: [] Not Met: [] Adjusted  2. Patient will have a decrease in pain to facilitate improvement in movement, function, and ADLs as indicated by Functional Deficits. [x] Progressing: [] Met: [] Not Met: [] Adjusted    Long Term Goals: To be achieved in: 12 weeks total (4 additional weeks)  1. Disability index score of 69% or more for the FOTO Neck questionnaire to assist with reaching prior level of function. [] Progressing: [x] Met: [] Not Met: [] Adjusted  2. Patient will demonstrate increased AROM of bilateral rotation and extension to WNL without pain to allow for proper joint functioning as indicated by patients Functional Deficits. [x] Progressing: [] Met: [] Not Met: [] Adjusted  3.  Patient will demonstrate an increase in postural awareness and control and activation of  Deep cervical stabilizers to allow for proper functional mobility as indicated by patients Functional Deficits. [] Progressing: [x] Met: [] Not Met: [] Adjusted   4. Patient will be able to sleep through the night without increased symptoms or restriction in his neck/back. [] Progressing: [] Met: [] Not Met: [] Adjusted  5. Patient will be able to drive without increased symptoms or restrictions. (patient specific functional goal) [] Progressing: [] Met: [] Not Met: [] Adjusted      Overall Progression Towards Functional goals/ Treatment Progress Update:  [x] Patient is progressing as expected towards functional goals listed. [] Progression is slowed due to complexities/Impairments listed. [] Progression has been slowed due to co-morbidities. [] Plan just implemented, too soon to assess goals progression <30days   [] Goals require adjustment due to lack of progress  [] Patient is not progressing as expected and requires additional follow up with physician  [] Other    Prognosis for POC: [x] Good [] Fair  [] Poor      Patient requires continued skilled intervention: [x] Yes  [] No      ASSESSMENT:  The patient with a history of neck pain presents to physical therapy with improved ROM and decreased pain compared to initial evaluation. Patient demonstrates less tightness in neck musculature compared to initial evaluation according to last treatment note . Continues to have most restriction at CT junction. Patient would continue to benefit from skilled physical therapy in order to decrease pain, increase ROM, and to return to PLOF so patient can perform ADLs and IADLs without pain.  .     Treatment/Activity Tolerance:  [x] Patient tolerated treatment well [] Patient limited by fatique  [] Patient limited by pain  [] Patient limited by other medical complications  [] Other:     Prognosis: [x] Good [] Fair  [] Poor    Patient Requires Follow-up: [x] Yes  [] No    PLAN: 1-2 days per week for a total of 12 Weeks (4 more weeks)  [] Continue per plan of care [x] Alter current plan (see comments above)  [] Plan of care initiated [] Hold pending MD visit [] Discharge      Electronically signed by:  Dara Lopez PT, DPT, CSCS    Note: If patient does not return for scheduled/ recommended follow up visits, this note will serve as a discharge from care along with most recent update on progress.

## 2022-08-25 ENCOUNTER — HOSPITAL ENCOUNTER (OUTPATIENT)
Dept: PHYSICAL THERAPY | Age: 42
Setting detail: THERAPIES SERIES
Discharge: HOME | End: 2022-08-25
Payer: COMMERCIAL

## 2022-08-25 PROCEDURE — 97110 THERAPEUTIC EXERCISES: CPT | Performed by: PHYSICAL THERAPIST

## 2022-08-25 PROCEDURE — 97140 MANUAL THERAPY 1/> REGIONS: CPT | Performed by: PHYSICAL THERAPIST

## 2022-08-25 NOTE — FLOWSHEET NOTE
[facility], [address]  Phone: [phone]  Fax [phone]      Physical Therapy Treatment Note/ Progress Report:         Date:  2022    Patient Name:  Juana Lay    :  1980  MRN: [id number]  Restrictions/Precautions:    Medical/Treatment Diagnosis Information:  Diagnosis: Strain of neck muscle (S16.1XXD), cervical radiculopathy (M54.12)  Treatment Diagnosis: Neck pain (M54.2), thoracic INTM(N75.2),  Insurance/Certification information:  PT Insurance Information: [facility]  Physician Information:  Rock Do   Has the plan of care been signed (Y/N):        [x]  Yes  []  No     Date of Patient follow up with Physician:     Is this a Progress Report:     []  Yes  [x]  No        If Yes: Date Range for reporting period:  Beginnin22  Ending:     Progress report will be due (10 Rx or 30 days whichever is less): 3/84/49      Recertification will be due (POC Duration  / 90 days whichever is less):  22       Visit # Insurance Allowable Auth Required   In-person 9 60 []  Yes []  No    Telehealth   []  Yes []  No    Total        Functional Scale: FOTO Neck 48/100 (48%)    Date assessed:  22  Functional Scale: NDI  39.9/100      Date assessed:  22  Functional Scale: FOTO Neck 61/100 (61%)    Date assessed:  22  Functional Scale: NDI  20.8/100      Date assessed:  22     Therapy Diagnosis/Practice Pattern: F      Number of Comorbidities:  []0     [x]1-2    []3+     Latex Allergy:  [x]NO      []YES  Preferred Language for Healthcare:   [x]English       []other:    Pain level:  3-4/10     SUBJECTIVE:  Patient reports his neck seems to be improving each week. Has not really had much sharp or shooting pains the past week or so. Feels about 75% back to PLOF. Has been sleeping better, but still has been sore in the morning.      OBJECTIVE:   Test Measurements:  NT this date  CERV ROM 8/18/22    Cervical Flexion 35     Cervical Extension 35 Pain at end range   Cervical SB     Cervical rotation 60 65           ROM Left Right   Shoulder Flex [facility] WFL   Shoulder Abd [facility]   Shoulder ER [facility]   Shoulder IR [facility] WFL           Strength Left Right   Shoulder Flex 5/5 4+/5   Shoulder Scap 5/5 4/5   Shoulder ER 5/5 5/5   Shoulder IR 5/5 5/5       RESTRICTIONS/PRECAUTIONS:     Exercises/Interventions:   Therapeutic Ex        Sets/sec Reps Notes   No pain when patient does not extend head      With light OP   Wing armed breathing 1 15x No $ YTB  5\" 15x        Patient education  5'  Injection for spine,  posture   Standing open book stretch 1 10 ea R/L             Manual Intervention           IASTM to T spine paraspinals, BUT, B levator, framing around CT J 10'     PA T spine 5' Cervical side glides 5'     Manual traction 5'             NMR re-education          HEP       Chin tucks with B shoulder flex with ADD ring 1 5 Cueing to perform slowly   Chin tucks with ADD ring press up 1 5    Chin tuck hold for endurance 30\" 2x    Cueing for scap and neck position   Traction                                 HEP instruction:   Access Code: [email or web address]  URL: [email or web address]  Date: 06/30/2022  Prepared by: Gena Nowak    Therapeutic Exercise and NMR EXR  [x] (50444) Provided verbal/tactile cueing for activities related to strengthening, flexibility, endurance, ROM  for improvements in cervical, postural, scapular, scapulothoracic and UE control with self care, reaching, carrying, lifting, house/yardwork, driving/computer work. [x] (81715) Provided verbal/tactile cueing for activities related to improving balance, coordination, kinesthetic sense, posture, motor skill, proprioception  to assist with cervical, scapular, scapulothoracic and UE control with self care, reaching, carrying, lifting, house/yardwork, driving/computer work.     Therapeutic Activities:    [] (18703 or 73295) Provided verbal/tactile cueing for activities related to improving balance, coordination, kinesthetic sense, posture, motor skill, proprioception and motor activation to allow for proper function of cervical, scapular, scapulothoracic and UE control with self care, carrying, lifting, driving/computer work. Home Exercise Program:    [x] (33930) Reviewed/Progressed HEP activities related to strengthening, flexibility, endurance, ROM of cervical, scapular, scapulothoracic and UE control with self care, reaching, carrying, lifting, house/yardwork, driving/computer work  [] (65356) Reviewed/Progressed HEP activities related to improving balance, coordination, kinesthetic sense, posture, motor skill, proprioception of cervical, scapular, scapulothoracic and UE control with self care, reaching, carrying, lifting, house/yardwork, driving/computer work      Manual Treatments:  PROM / STM / Oscillations-Mobs:  G-I, II, III, IV (PA's, Inf., Post.)  [x] (28047) Provided manual therapy to mobilize soft tissue/joints of cervical/CT, scapular GHJ and UE for the purpose of decreasing headache, modulating pain, promoting relaxation,  increasing ROM, reducing/eliminating soft tissue swelling/inflammation/restriction, improving soft tissue extensibility and allowing for proper ROM for normal function with self care, reaching, carrying, lifting, house/yardwork, driving/computer work    Modalities:  None this date    Charges  Timed Code Treatment Minutes: 40'   Total Treatment Minutes: 48'       [] EVAL (LOW) 23982   [] EVAL (MOD) 28628   [] EVAL (HIGH) 46362   [] RE-EVAL     [x] DX(08528) x  1   [] IONTO  [] NMR (67608) x     [] VASO  [x] Manual (90889) x  2    [] Other:  [] TA x      [] Mech Traction (32985)  [] ES(attended) (55675)      [] ES (un) (41622):       GOALS:  Patient stated goal: \"Eliminate pain/tension. \"  [] Progressing: [] Met: [] Not Met: [] Adjusted    Therapist goals for Patient:   Short Term Goals:  To be achieved in: 2 weeks  1. Independent in HEP and progression per patient tolerance, in order to prevent re-injury. [] Progressing: [x] Met: [] Not Met: [] Adjusted  2. Patient will have a decrease in pain to facilitate improvement in movement, function, and ADLs as indicated by Functional Deficits. [x] Progressing: [] Met: [] Not Met: [] Adjusted    Long Term Goals: To be achieved in: 12 weeks total (4 additional weeks)  1. Disability index score of 69% or more for the FOTO Neck questionnaire to assist with reaching prior level of function. [] Progressing: [x] Met: [] Not Met: [] Adjusted  2. Patient will demonstrate increased AROM of bilateral rotation and extension to WNL without pain to allow for proper joint functioning as indicated by patients Functional Deficits. [x] Progressing: [] Met: [] Not Met: [] Adjusted  3. Patient will demonstrate an increase in postural awareness and control and activation of  Deep cervical stabilizers to allow for proper functional mobility as indicated by patients Functional Deficits. [] Progressing: [x] Met: [] Not Met: [] Adjusted   4. Patient will be able to sleep through the night without increased symptoms or restriction in his neck/back. [x] Progressing: [] Met: [] Not Met: [] Adjusted  5. Patient will be able to drive without increased symptoms or restrictions. (patient specific functional goal) [x] Progressing: [] Met: [] Not Met: [] Adjusted      Overall Progression Towards Functional goals/ Treatment Progress Update:  [x] Patient is progressing as expected towards functional goals listed. [] Progression is slowed due to complexities/Impairments listed. [] Progression has been slowed due to co-morbidities.   [] Plan just implemented, too soon to assess goals progression <30days   [] Goals require adjustment due to lack of progress  [] Patient is not progressing as expected and requires additional follow up with physician  [] Other    Prognosis for POC: [x] Good [] Fair  [] Poor      Patient requires continued skilled intervention: [x] Yes  [] No      ASSESSMENT:  Patient demonstrates less tightness in upper trap and CT junction. Still with tightness in T spine but overall is progressing well towards goals and feeling less pain and sxs. Treatment/Activity Tolerance:  [x] Patient tolerated treatment well [] Patient limited by fatique  [] Patient limited by pain  [] Patient limited by other medical complications  [] Other:     Prognosis: [x] Good [] Fair  [] Poor    Patient Requires Follow-up: [x] Yes  [] No    PLAN: 1-2 days per week  [x] Continue per plan of care [] Alter current plan (see comments above)  [] Plan of care initiated [] Hold pending MD visit [] Discharge      Electronically signed by:  Alvin Powers, PT, DPT, CSCS    Note: If patient does not return for scheduled/ recommended follow up visits, this note will serve as a discharge from care along with most recent update on progress.

## 2022-09-01 ENCOUNTER — HOSPITAL ENCOUNTER (OUTPATIENT)
Dept: PHYSICAL THERAPY | Age: 42
Setting detail: THERAPIES SERIES
Discharge: HOME | End: 2022-09-01
Payer: COMMERCIAL

## 2022-09-01 PROCEDURE — 97140 MANUAL THERAPY 1/> REGIONS: CPT | Performed by: PHYSICAL THERAPIST

## 2022-09-01 PROCEDURE — 97110 THERAPEUTIC EXERCISES: CPT | Performed by: PHYSICAL THERAPIST

## 2022-09-01 NOTE — FLOWSHEET NOTE
[facility], [address]  Phone: [phone]  Fax [phone]      Physical Therapy Treatment Note/ Progress Report:         Date:  2022    Patient Name:  Gianna Ramírez    :  1980  MRN: [id number]  Restrictions/Precautions:    Medical/Treatment Diagnosis Information:  Diagnosis: Strain of neck muscle (S16.1XXD), cervical radiculopathy (M54.12)  Treatment Diagnosis: Neck pain (M54.2), thoracic SGLP(G12.2),  Insurance/Certification information:  PT Insurance Information: [facility]  Physician Information:  Verla Record   Has the plan of care been signed (Y/N):        [x]  Yes  []  No     Date of Patient follow up with Physician:     Is this a Progress Report:     []  Yes  [x]  No        If Yes: Date Range for reporting period:  Beginnin22  Ending:     Progress report will be due (10 Rx or 30 days whichever is less):       Recertification will be due (POC Duration  / 90 days whichever is less):  22       Visit # Insurance Allowable Auth Required   In-person 10 60 []  Yes []  No    Telehealth   []  Yes []  No    Total        Functional Scale: FOTO Neck 48/100 (48%)    Date assessed:  22  Functional Scale: NDI  39.9/100      Date assessed:  22  Functional Scale: FOTO Neck 61/100 (61%)    Date assessed:  22  Functional Scale: NDI  20.8/100      Date assessed:  22     Therapy Diagnosis/Practice Pattern: F      Number of Comorbidities:  []0     [x]1-2    []3+     Latex Allergy:  [x]NO      []YES  Preferred Language for Healthcare:   [x]English       []other:    Pain level:  5/10     SUBJECTIVE:  Patient reports that his neck is more sore today because he had to work on his son's car that had a flat tire. Feels more achy but still not having the shooting pain like he was.       OBJECTIVE:   Test Measurements:  NT this date  CERV ROM   22    Cervical Flexion 35     Cervical verbal/tactile cueing for activities related to improving balance, coordination, kinesthetic sense, posture, motor skill, proprioception and motor activation to allow for proper function of cervical, scapular, scapulothoracic and UE control with self care, carrying, lifting, driving/computer work. Home Exercise Program:    [x] (22479) Reviewed/Progressed HEP activities related to strengthening, flexibility, endurance, ROM of cervical, scapular, scapulothoracic and UE control with self care, reaching, carrying, lifting, house/yardwork, driving/computer work  [] (16285) Reviewed/Progressed HEP activities related to improving balance, coordination, kinesthetic sense, posture, motor skill, proprioception of cervical, scapular, scapulothoracic and UE control with self care, reaching, carrying, lifting, house/yardwork, driving/computer work      Manual Treatments:  PROM / STM / Oscillations-Mobs:  G-I, II, III, IV (PA's, Inf., Post.)  [x] (83406) Provided manual therapy to mobilize soft tissue/joints of cervical/CT, scapular GHJ and UE for the purpose of decreasing headache, modulating pain, promoting relaxation,  increasing ROM, reducing/eliminating soft tissue swelling/inflammation/restriction, improving soft tissue extensibility and allowing for proper ROM for normal function with self care, reaching, carrying, lifting, house/yardwork, driving/computer work    Modalities:  None this date    Charges  Timed Code Treatment Minutes: 40'   Total Treatment Minutes: 48'       [] EVAL (LOW) 48188   [] EVAL (MOD) 55250   [] EVAL (HIGH) 94496   [] RE-EVAL     [x] KQ(53382) x  1   [] IONTO  [] NMR (24630) x     [] VASO  [x] Manual (54833) x  2    [] Other:  [] TA x      [] Mech Traction (32534)  [] ES(attended) (43533)      [] ES (un) (85544):       GOALS:  Patient stated goal: \"Eliminate pain/tension. \"  [] Progressing: [] Met: [] Not Met: [] Adjusted    Therapist goals for Patient:   Short Term Goals:  To be achieved in: 2 weeks  1. Independent in HEP and progression per patient tolerance, in order to prevent re-injury. [] Progressing: [x] Met: [] Not Met: [] Adjusted  2. Patient will have a decrease in pain to facilitate improvement in movement, function, and ADLs as indicated by Functional Deficits. [x] Progressing: [] Met: [] Not Met: [] Adjusted    Long Term Goals: To be achieved in: 12 weeks total (4 additional weeks)  1. Disability index score of 69% or more for the FOTO Neck questionnaire to assist with reaching prior level of function. [] Progressing: [x] Met: [] Not Met: [] Adjusted  2. Patient will demonstrate increased AROM of bilateral rotation and extension to WNL without pain to allow for proper joint functioning as indicated by patients Functional Deficits. [x] Progressing: [] Met: [] Not Met: [] Adjusted  3. Patient will demonstrate an increase in postural awareness and control and activation of  Deep cervical stabilizers to allow for proper functional mobility as indicated by patients Functional Deficits. [] Progressing: [x] Met: [] Not Met: [] Adjusted   4. Patient will be able to sleep through the night without increased symptoms or restriction in his neck/back. [x] Progressing: [] Met: [] Not Met: [] Adjusted  5. Patient will be able to drive without increased symptoms or restrictions. (patient specific functional goal) [x] Progressing: [] Met: [] Not Met: [] Adjusted      Overall Progression Towards Functional goals/ Treatment Progress Update:  [x] Patient is progressing as expected towards functional goals listed. [] Progression is slowed due to complexities/Impairments listed. [] Progression has been slowed due to co-morbidities.   [] Plan just implemented, too soon to assess goals progression <30days   [] Goals require adjustment due to lack of progress  [] Patient is not progressing as expected and requires additional follow up with physician  [] Other    Prognosis for POC: [x] Good [] Fair  [] Poor      Patient requires continued skilled intervention: [x] Yes  [] No      ASSESSMENT:  Patient with mild increase in T spine tightness, with cavitation noted mid T spine with PA's. Initially tight in mid C spine but improved after STM and mobilizations. Will continue to progress as tolerated. Treatment/Activity Tolerance:  [x] Patient tolerated treatment well [] Patient limited by fatique  [] Patient limited by pain  [] Patient limited by other medical complications  [] Other:     Prognosis: [x] Good [] Fair  [] Poor    Patient Requires Follow-up: [x] Yes  [] No    PLAN: 1-2 days per week  [x] Continue per plan of care [] Alter current plan (see comments above)  [] Plan of care initiated [] Hold pending MD visit [] Discharge      Electronically signed by:  Erin Spence PT, DPT [id number]     Note: If patient does not return for scheduled/ recommended follow up visits, this note will serve as a discharge from care along with most recent update on progress.

## 2022-09-08 ENCOUNTER — HOSPITAL ENCOUNTER (OUTPATIENT)
Dept: PHYSICAL THERAPY | Age: 42
Setting detail: THERAPIES SERIES
Discharge: HOME | End: 2022-09-08
Payer: COMMERCIAL

## 2022-09-08 PROCEDURE — 97140 MANUAL THERAPY 1/> REGIONS: CPT | Performed by: PHYSICAL THERAPIST

## 2022-09-08 PROCEDURE — 97110 THERAPEUTIC EXERCISES: CPT | Performed by: PHYSICAL THERAPIST

## 2022-09-08 NOTE — FLOWSHEET NOTE
[facility], [address]  Phone: [phone]  Fax [phone]      Physical Therapy Treatment Note/ Progress Report:         Date:  2022    Patient Name:  Edmar Barrios    :  1980  MRN: [id number]  Restrictions/Precautions:    Medical/Treatment Diagnosis Information:  Diagnosis: Strain of neck muscle (S16.1XXD), cervical radiculopathy (M54.12)  Treatment Diagnosis: Neck pain (M54.2), thoracic YVYX(Q78.5),  Insurance/Certification information:  PT Insurance Information: [facility]  Physician Information:  Donny Arevalo   Has the plan of care been signed (Y/N):        [x]  Yes  []  No     Date of Patient follow up with Physician:     Is this a Progress Report:     []  Yes  [x]  No        If Yes: Date Range for reporting period:  Beginnin22  Ending:     Progress report will be due (10 Rx or 30 days whichever is less): 70      Recertification will be due (POC Duration  / 90 days whichever is less):  22       Visit # Insurance Allowable Auth Required   In-person 10 60 []  Yes []  No    Telehealth   []  Yes []  No    Total        Functional Scale: FOTO Neck 48/100 (48%)    Date assessed:  22  Functional Scale: NDI  39.9/100      Date assessed:  22  Functional Scale: FOTO Neck 61/100 (61%)    Date assessed:  22  Functional Scale: NDI  20.8/100      Date assessed:  22     Therapy Diagnosis/Practice Pattern: F      Number of Comorbidities:  []0     [x]1-2    []3+     Latex Allergy:  [x]NO      []YES  Preferred Language for Healthcare:   [x]English       []other:    Pain level:  0-2/10     SUBJECTIVE:  Patient reports that he has been feeling good since last week. Not much pain.      OBJECTIVE:   Test Measurements:  NT this date  CERV ROM   22    Cervical Flexion 35     Cervical Extension 35 Pain at end range   Cervical SB     Cervical rotation 60 65           ROM Left Right Shoulder Flex [facility] WFL   [facility]   Shoulder ER [facility]   Shoulder IR [facility] WFL           Strength Left Right   Shoulder Flex 5/5 4+/5   Shoulder Scap 5/5 4/5   Shoulder ER 5/5 5/5   Shoulder IR 5/5 5/5       RESTRICTIONS/PRECAUTIONS:     Exercises/Interventions:   Therapeutic Ex        Sets/sec Reps Notes   No pain when patient does not extend head      With light OP   Wing armed breathing 1 15x No $ YTB  5\" 15x        Patient education  5'  Gentle chin tuck  without activating global musculature, posture, avoiding heavy lifting              Manual Intervention           IASTM to T spine paraspinals, BUT, B levator, framing around CT J 10'     PA T spine 5'    Cervical side glides 5'     Manual traction 5'             NMR re-education          HEP       Chin tucks with B shoulder flex with ADD ring 1 5 Cueing to perform slowly   Chin tucks with ADD ring press up 1 10x    Chin tuck hold for endurance 30\" 2x    Cueing for scap and neck position   Traction                                 HEP instruction:   Access Code: [email or web address]  URL: [email or web address]  Date: 2022  Prepared by: Roxy Kraus    Therapeutic Exercise and NMR EXR  [x] (28345) Provided verbal/tactile cueing for activities related to strengthening, flexibility, endurance, ROM  for improvements in cervical, postural, scapular, scapulothoracic and UE control with self care, reaching, carrying, lifting, house/yardwork, driving/computer work. [x] (80029) Provided verbal/tactile cueing for activities related to improving balance, coordination, kinesthetic sense, posture, motor skill, proprioception  to assist with cervical, scapular, scapulothoracic and UE control with self care, reaching, carrying, lifting, house/yardwork, driving/computer work.     Therapeutic Activities:    [] (77978 or 97523) Provided verbal/tactile cueing for activities related to improving balance, coordination, kinesthetic sense, posture, motor skill, proprioception and motor activation to allow for proper function of cervical, scapular, scapulothoracic and UE control with self care, carrying, lifting, driving/computer work. Home Exercise Program:    [x] (20625) Reviewed/Progressed HEP activities related to strengthening, flexibility, endurance, ROM of cervical, scapular, scapulothoracic and UE control with self care, reaching, carrying, lifting, house/yardwork, driving/computer work  [] (93152) Reviewed/Progressed HEP activities related to improving balance, coordination, kinesthetic sense, posture, motor skill, proprioception of cervical, scapular, scapulothoracic and UE control with self care, reaching, carrying, lifting, house/yardwork, driving/computer work      Manual Treatments:  PROM / STM / Oscillations-Mobs:  G-I, II, III, IV (PA's, Inf., Post.)  [x] (99908) Provided manual therapy to mobilize soft tissue/joints of cervical/CT, scapular GHJ and UE for the purpose of decreasing headache, modulating pain, promoting relaxation,  increasing ROM, reducing/eliminating soft tissue swelling/inflammation/restriction, improving soft tissue extensibility and allowing for proper ROM for normal function with self care, reaching, carrying, lifting, house/yardwork, driving/computer work    Modalities:  None this date    Charges  Timed Code Treatment Minutes: 40'   Total Treatment Minutes: 40'       [] EVAL (LOW) 16503   [] EVAL (MOD) 72978   [] EVAL (HIGH) 22333   [] RE-EVAL     [x] EB(20949) x  1   [] IONTO  [] NMR (81955) x     [] VASO  [x] Manual (19847) x  2    [] Other:  [] TA x      [] Mech Traction (52672)  [] ES(attended) (18594)      [] ES (un) (33155):       GOALS:  Patient stated goal: \"Eliminate pain/tension. \"  [] Progressing: [] Met: [] Not Met: [] Adjusted    Therapist goals for Patient:   Short Term Goals: To be achieved in: 2 weeks  1. Independent in HEP and progression per patient tolerance, in order to prevent re-injury.    [] Progressing: [x] Met: [] Not Met: [] Adjusted  2. Patient will have a decrease in pain to facilitate improvement in movement, function, and ADLs as indicated by Functional Deficits. [x] Progressing: [] Met: [] Not Met: [] Adjusted    Long Term Goals: To be achieved in: 12 weeks total (4 additional weeks)  1. Disability index score of 69% or more for the FOTO Neck questionnaire to assist with reaching prior level of function. [] Progressing: [x] Met: [] Not Met: [] Adjusted  2. Patient will demonstrate increased AROM of bilateral rotation and extension to WNL without pain to allow for proper joint functioning as indicated by patients Functional Deficits. [x] Progressing: [] Met: [] Not Met: [] Adjusted  3. Patient will demonstrate an increase in postural awareness and control and activation of  Deep cervical stabilizers to allow for proper functional mobility as indicated by patients Functional Deficits. [] Progressing: [x] Met: [] Not Met: [] Adjusted   4. Patient will be able to sleep through the night without increased symptoms or restriction in his neck/back. [x] Progressing: [] Met: [] Not Met: [] Adjusted  5. Patient will be able to drive without increased symptoms or restrictions. (patient specific functional goal) [x] Progressing: [] Met: [] Not Met: [] Adjusted      Overall Progression Towards Functional goals/ Treatment Progress Update:  [x] Patient is progressing as expected towards functional goals listed. [] Progression is slowed due to complexities/Impairments listed. [] Progression has been slowed due to co-morbidities.   [] Plan just implemented, too soon to assess goals progression <30days   [] Goals require adjustment due to lack of progress  [] Patient is not progressing as expected and requires additional follow up with physician  [] Other    Prognosis for POC: [x] Good [] Fair  [] Poor      Patient requires continued skilled intervention: [x] Yes  [] No      ASSESSMENT:  Patient with improved mobility

## 2022-09-15 ENCOUNTER — HOSPITAL ENCOUNTER (OUTPATIENT)
Dept: PHYSICAL THERAPY | Age: 42
Setting detail: THERAPIES SERIES
Discharge: HOME | End: 2022-09-15
Payer: COMMERCIAL

## 2022-09-15 PROCEDURE — 97140 MANUAL THERAPY 1/> REGIONS: CPT

## 2022-09-15 PROCEDURE — 97110 THERAPEUTIC EXERCISES: CPT

## 2022-09-15 PROCEDURE — 97112 NEUROMUSCULAR REEDUCATION: CPT

## 2022-09-15 NOTE — FLOWSHEET NOTE
[facility], [address]  Phone: [phone]  Fax [phone]      Physical Therapy Treatment Note/ Progress Report:         Date:  9/15/2022    Patient Name:  Sheila Sainz    :  1980  MRN: [id number]  Restrictions/Precautions:    Medical/Treatment Diagnosis Information:  Diagnosis: Strain of neck muscle (S16.1XXD), cervical radiculopathy (M54.12)  Treatment Diagnosis: Neck pain (M54.2), thoracic ZVXF(F93.2),  Insurance/Certification information:  PT Insurance Information: [facility]  Physician Information:  Fidelia Mcrae   Has the plan of care been signed (Y/N):        [x]  Yes  []  No     Date of Patient follow up with Physician:     Is this a Progress Report:     []  Yes  [x]  No        If Yes: Date Range for reporting period:  Beginnin22  Ending:     Progress report will be due (10 Rx or 30 days whichever is less): 09      Recertification will be due (POC Duration  / 90 days whichever is less):  22       Visit # Insurance Allowable Auth Required   In-person 11 60 []  Yes []  No    Telehealth   []  Yes []  No    Total        Functional Scale: FOTO Neck 48/100 (48%)    Date assessed:  22  Functional Scale: NDI  39.9/100      Date assessed:  22  Functional Scale: FOTO Neck 61/100 (61%)    Date assessed:  22  Functional Scale: NDI  20.8/100      Date assessed:  22     Therapy Diagnosis/Practice Pattern: F      Number of Comorbidities:  []0     [x]1-2    []3+     Latex Allergy:  [x]NO      []YES  Preferred Language for Healthcare:   [x]English       []other:    Pain level:  0-2/10     SUBJECTIVE:  Patient reports that he has been feeling good since last week. The patient feels like he is 90% better. The patient reports they sometimes feel discomfort on the L side near his upper trapezius.     OBJECTIVE:   Test Measurements:  NT this date  CERV ROM   22    Cervical Flexion Activities:    [] (04475 or 23403) Provided verbal/tactile cueing for activities related to improving balance, coordination, kinesthetic sense, posture, motor skill, proprioception and motor activation to allow for proper function of cervical, scapular, scapulothoracic and UE control with self care, carrying, lifting, driving/computer work. Home Exercise Program:    [x] (34577) Reviewed/Progressed HEP activities related to strengthening, flexibility, endurance, ROM of cervical, scapular, scapulothoracic and UE control with self care, reaching, carrying, lifting, house/yardwork, driving/computer work  [] (45180) Reviewed/Progressed HEP activities related to improving balance, coordination, kinesthetic sense, posture, motor skill, proprioception of cervical, scapular, scapulothoracic and UE control with self care, reaching, carrying, lifting, house/yardwork, driving/computer work      Manual Treatments:  PROM / STM / Oscillations-Mobs:  G-I, II, III, IV (PA's, Inf., Post.)  [x] (37658) Provided manual therapy to mobilize soft tissue/joints of cervical/CT, scapular GHJ and UE for the purpose of decreasing headache, modulating pain, promoting relaxation,  increasing ROM, reducing/eliminating soft tissue swelling/inflammation/restriction, improving soft tissue extensibility and allowing for proper ROM for normal function with self care, reaching, carrying, lifting, house/yardwork, driving/computer work    Modalities:  None this date    Charges  Timed Code Treatment Minutes: 45'   Total Treatment Minutes: 39'       [] EVAL (LOW) 69242   [] EVAL (MOD) 96768   [] EVAL (HIGH) 41704   [] RE-EVAL     [x] YQ(58886) x  1   [] IONTO  [x] NMR (70197) x 1    [] VASO  [x] Manual (70502) x 1   [] Other:  [] TA x      [] Mech Traction (43798)  [] ES(attended) (12929)     [] ES (un) (15028):       GOALS:  Patient stated goal: \"Eliminate pain/tension. \"  [] Progressing: [] Met: [] Not Met: [] Adjusted    Therapist goals for Patient: Other    Prognosis for POC: [x] Good [] Fair  [] Poor      Patient requires continued skilled intervention: [x] Yes  [] No      ASSESSMENT:  Patient did well today. The patient able to progress in chin tucks and no money exercise today. The patient demonstrates good neuromuscular control with chin tucks in supine with flexion and add squeezes. Will decrease to every other week after NV according to next note by primary therapist.    Treatment/Activity Tolerance:  [x] Patient tolerated treatment well [] Patient limited by fatique  [] Patient limited by pain  [] Patient limited by other medical complications  [] Other:     Prognosis: [x] Good [] Fair  [] Poor    Patient Requires Follow-up: [x] Yes  [] No    PLAN: 1-2 days per week  [x] Continue per plan of care [] Alter current plan (see comments above)  [] Plan of care initiated [] Hold pending MD visit [] Discharge      Electronically signed by:  Mayda Jay, PT, DPT, CSCS    Note: If patient does not return for scheduled/ recommended follow up visits, this note will serve as a discharge from care along with most recent update on progress.

## 2022-09-27 ENCOUNTER — HOSPITAL ENCOUNTER (OUTPATIENT)
Dept: PHYSICAL THERAPY | Age: 42
Setting detail: THERAPIES SERIES
Discharge: HOME | End: 2022-09-27
Payer: COMMERCIAL

## 2022-09-27 PROCEDURE — 97110 THERAPEUTIC EXERCISES: CPT | Performed by: PHYSICAL THERAPIST

## 2022-09-27 PROCEDURE — 97140 MANUAL THERAPY 1/> REGIONS: CPT | Performed by: PHYSICAL THERAPIST

## 2022-09-27 NOTE — PLAN OF CARE
[place] [address] and [facility], [address] [place] [address]  Phone: [phone]  Fax [phone]  Physical Therapy Re-Certification Plan of Care/MD UPDATE      Dear Fidelia Mcrae ,    We had the pleasure of treating the following patient for physical therapy services at [address] [place] [address]. A summary of our findings can be found in the updated assessment below. This includes our plan of care. If you have any questions or concerns regarding these findings, please do not hesitate to contact me at the office phone number checked above.   Thank you for the referral.     Physician Signature:________________________________Date:__________________  By signing above (or electronic signature), therapists plan is approved by physician    Date Range Of Visits: 22 - 22  Total Visits to Date: 12  Overall Response to Treatment:   [x]Patient is responding well to treatment and improvement is noted with regards  to goals   []Patient should continue to improve in reasonable time if they continue HEP   []Patient has plateaued and is no longer responding to skilled PT intervention    []Patient is getting worse and would benefit from return to referring MD   []Patient unable to adhere to initial POC   []Other:         Physical Therapy Treatment Note/ Progress Report:         Date:  2022    Patient Name:  Sheila Sainz    :  1980  MRN: [id number]  Restrictions/Precautions:    Medical/Treatment Diagnosis Information:  Diagnosis: Strain of neck muscle (S16.1XXD), cervical radiculopathy (M54.12)  Treatment Diagnosis: Neck pain (M54.2), thoracic TZFR(V61.1),  Insurance/Certification information:  PT Insurance Information: BCBS  Physician Information:  Fidelia Mcrae   Has the plan of care been signed (Y/N):        [x]  Yes  []  No     Date of Patient follow up with Physician:     Is this a Progress Report:     []  Yes  [x]  No If Yes: Date Range for reporting period:  Beginnin22  Endin22    Progress report will be due (10 Rx or 30 days whichever is less):       Recertification will be due (POC Duration  / 90 days whichever is less):  22       Visit # Insurance Allowable Auth Required   In-person 12 60 []  Yes []  No    Telehealth   []  Yes []  No    Total        Functional Scale: FOTO Neck 48/100 (48%)    Date assessed:  22  Functional Scale: NDI  39.9/100      Date assessed:  22  Functional Scale: FOTO Neck 61/100 (61%)    Date assessed:  22  Functional Scale: NDI  20.8/100      Date assessed:  22  Functional Scale: FOTO 82/100 (82%)     Date assessed: 22     Therapy Diagnosis/Practice Pattern: F      Number of Comorbidities:  []0     [x]1-2    []3+     Latex Allergy:  [x]NO      []YES  Preferred Language for Healthcare:   [x]English       []other:    Pain level:  0-2/10     SUBJECTIVE:  Patient reports that his neck is feeling the best it has felt since he has started PT. Very happy with his progress. Mild stiffness in the morning, but not having many limitations otherwise.     OBJECTIVE:   Test Measurements:  NT this date  CERV ROM   22   Cervical Flexion 35     Cervical Extension 35 WNL no pain   Cervical SB     Cervical rotation 65 65           ROM Left Right   Shoulder Flex [facility] WFL   Shoulder Abd [facility]   Shoulder ER [facility]   Shoulder IR [facility] WFL           Strength Left Right   Shoulder Flex 5/5 4+/5   Shoulder Scap 5/5 4/5   Shoulder ER 5/5 5/5   Shoulder IR 5/5 5/5       RESTRICTIONS/PRECAUTIONS:     Exercises/Interventions:   Therapeutic Ex        Sets/sec Reps Notes   No pain when patient does not extend head      With light OP   No $ YTB  5\" 15x    pec stretch in true stretch 30\" 4x        Patient education  5'  HEP moving forward, posture, slow and controlled exercise        RTB ER  2  10 ea B       Manual Intervention           IASTM STM to T spine paraspinals, BUT, B levator, framing around CT J 10'     PA T spine 5'    Cervical side glides 5'     Manual traction 5'             NMR re-education          HEP       Cueing to perform slowly      Chin tuck hold for endurance 15\" 10    Cueing for scap and neck position   Traction                                 HEP instruction:   Access Code: [email or web address]  URL: [email or web address]  Date: 06/30/2022  Prepared by: Marcella Luciano    Therapeutic Exercise and NMR EXR  [x] (01354) Provided verbal/tactile cueing for activities related to strengthening, flexibility, endurance, ROM  for improvements in cervical, postural, scapular, scapulothoracic and UE control with self care, reaching, carrying, lifting, house/yardwork, driving/computer work. [x] (44907) Provided verbal/tactile cueing for activities related to improving balance, coordination, kinesthetic sense, posture, motor skill, proprioception  to assist with cervical, scapular, scapulothoracic and UE control with self care, reaching, carrying, lifting, house/yardwork, driving/computer work. Therapeutic Activities:    [] (27380 or 70966) Provided verbal/tactile cueing for activities related to improving balance, coordination, kinesthetic sense, posture, motor skill, proprioception and motor activation to allow for proper function of cervical, scapular, scapulothoracic and UE control with self care, carrying, lifting, driving/computer work.      Home Exercise Program:    [x] (89989) Reviewed/Progressed HEP activities related to strengthening, flexibility, endurance, ROM of cervical, scapular, scapulothoracic and UE control with self care, reaching, carrying, lifting, house/yardwork, driving/computer work  [] (81792) Reviewed/Progressed HEP activities related to improving balance, coordination, kinesthetic sense, posture, motor skill, proprioception of cervical, scapular, scapulothoracic and UE control with self care, reaching, carrying, lifting, house/yardwork, driving/computer work      Manual Treatments:  PROM / STM / Oscillations-Mobs:  G-I, II, III, IV (PA's, Inf., Post.)  [x] (08673) Provided manual therapy to mobilize soft tissue/joints of cervical/CT, scapular GHJ and UE for the purpose of decreasing headache, modulating pain, promoting relaxation,  increasing ROM, reducing/eliminating soft tissue swelling/inflammation/restriction, improving soft tissue extensibility and allowing for proper ROM for normal function with self care, reaching, carrying, lifting, house/yardwork, driving/computer work    Modalities:  None this date    Charges  Timed Code Treatment Minutes: 40'   Total Treatment Minutes: 40'       [] EVAL (LOW) 24763   [] EVAL (MOD) 03149   [] EVAL (HIGH) 82009   [] RE-EVAL     [x] TJ(45782) x  1   [] IONTO  [] NMR (71773) x    [] VASO  [x] Manual (37146) x 2  [] Other:  [] TA x      [] Mech Traction (83785)  [] ES(attended) (71233)     [] ES (un) (77583):       GOALS:  Patient stated goal: \"Eliminate pain/tension. \"  [] Progressing: [] Met: [] Not Met: [] Adjusted    Therapist goals for Patient:   Short Term Goals: To be achieved in: 2 weeks  1. Independent in HEP and progression per patient tolerance, in order to prevent re-injury. [] Progressing: [x] Met: [] Not Met: [] Adjusted  2. Patient will have a decrease in pain to facilitate improvement in movement, function, and ADLs as indicated by Functional Deficits. [x] Progressing: [] Met: [] Not Met: [] Adjusted    Long Term Goals: To be achieved in: 1x/wk for 4 visits if needed, otherwise DC with HEP  1. Disability index score of 69% or more for the FOTO Neck questionnaire to assist with reaching prior level of function. [] Progressing: [x] Met: [] Not Met: [] Adjusted  2. Patient will demonstrate increased AROM of bilateral rotation and extension to WNL without pain to allow for proper joint functioning as indicated by patients Functional Deficits.    [] Progressing: [x] Met: [] Not Met: [] Adjusted  3. Patient will demonstrate an increase in postural awareness and control and activation of  Deep cervical stabilizers to allow for proper functional mobility as indicated by patients Functional Deficits. [] Progressing: [x] Met: [] Not Met: [] Adjusted   4. Patient will be able to sleep through the night without increased symptoms or restriction in his neck/back. [x] Progressing: [] Met: [] Not Met: [] Adjusted  5. Patient will be able to drive without increased symptoms or restrictions. (patient specific functional goal) [x] Progressing: [] Met: [] Not Met: [] Adjusted      Overall Progression Towards Functional goals/ Treatment Progress Update:  [x] Patient is progressing as expected towards functional goals listed. [] Progression is slowed due to complexities/Impairments listed. [] Progression has been slowed due to co-morbidities. [] Plan just implemented, too soon to assess goals progression <30days   [] Goals require adjustment due to lack of progress  [] Patient is not progressing as expected and requires additional follow up with physician  [] Other    Prognosis for POC: [x] Good [] Fair  [] Poor      Patient requires continued skilled intervention: [x] Yes  [] No      ASSESSMENT:  Patient has progressed well and has nearly full ROM. Per patient he has no restrictions in regards to his neck. Will trial DC with HEP.     Treatment/Activity Tolerance:  [x] Patient tolerated treatment well [] Patient limited by fatique  [] Patient limited by pain  [] Patient limited by other medical complications  [] Other:     Prognosis: [x] Good [] Fair  [] Poor    Patient Requires Follow-up: [x] Yes  [] No    PLAN: 1-2 days per week  [] Continue per plan of care [] Alter current plan (see comments above)  [] Plan of care initiated [] Hold pending MD visit [x] Trial Discharge      Electronically signed by:  Esther Villagomez, PT, DPT [id number]     Note: If patient does not return for scheduled/ recommended follow up visits, this note will serve as a discharge from care along with most recent update on progress.

## 2022-11-10 ENCOUNTER — TELEMEDICINE (OUTPATIENT)
Dept: PRIMARY CARE CLINIC | Age: 42
End: 2022-11-10
Payer: COMMERCIAL

## 2022-11-10 DIAGNOSIS — R05.8 UPPER AIRWAY COUGH SYNDROME: Primary | ICD-10-CM

## 2022-11-10 PROCEDURE — 99213 OFFICE O/P EST LOW 20 MIN: CPT | Performed by: STUDENT IN AN ORGANIZED HEALTH CARE EDUCATION/TRAINING PROGRAM

## 2022-11-10 RX ORDER — IPRATROPIUM BROMIDE 42 UG/1
SPRAY, METERED NASAL
COMMUNITY
Start: 2022-10-14

## 2022-11-10 NOTE — PROGRESS NOTES
[facility]                                           [address] [address], [address], [address] [facility] [address]        Phone: [phone]                                     NAME:  Lien Yang  :    1980      CONSULTANTS  Patient Care Team:  Red Humphries DO as PCP - General (Family Medicine)      CHIEF COMPLAINT  Lien Yang is a 42 y.o. male presenting as an established patient for acute, chronic, and personalized prevention plan services as noted below. HISTORY OF PRESENT ILLNESS    The patient presenting virtually for dry cough x 1 month. About a month ago on 10/14/2022, the patient experienced a lot of nasal congestion and was coughing up a lot of greenish sputum. He states the he had to constantly blow off his nose, had to sit in a recliner to sleep because of a lot of drainage. He states that he did not have fever but did felt congested and some tenderness along his sinuses. He had a tele health visit who diagnosed him with sinusitis and prescribed him amoxicillin 500 mg for 5 days and ipratropium nasal spray, which have helped dry him up. All of his symptoms have since resolved except for lingering cough that happens throughout the day. He states that the cough seems to be worse in the morning but it occurs throughout the day. No other associated symptoms. No SOB or GERD. The patient would like to know what can be done to improve his chronic dry cough. PAST MEDICAL HISTORY  Past Medical History:   Diagnosis Date    Paez syndrome        PAST SURGICAL HISTORY  Past Surgical History:   Procedure Laterality Date    KNEE ARTHROPLASTY Right     NASAL SEPTUM SURGERY  2010       HOME MEDS  Prior to Visit Medications    Medication Sig Taking?  Authorizing Provider   ipratropium (ATROVENT) 0.06 % nasal spray   Historical Provider, MD   meloxicam (MOBIC) 15 MG tablet Take 1 tablet by mouth daily as needed for Pain  Patient not taking: Reported on 11/10/2022  Natalia Landa PA-C   methylPREDNISolone (MEDROL, VITOR,) 4 MG tablet Take by mouth. Patient not taking: Reported on 11/10/2022  Henri Maya PA-C   predniSONE (DELTASONE) 10 MG tablet Take 1 tablet three times a day for 7 days, take 1 tablet twice a day for 7 days, then take Medrol Dosepak  Patient not taking: Reported on 11/10/2022  Henri Maya PA-C   pantoprazole (PROTONIX) 40 MG tablet Take 40 mg by mouth every morning (before breakfast)  Patient not taking: Reported on 11/10/2022  Historical Provider, MD       ALLERGIES  Patient has no known allergies. FAMILY HISTORY      Problem Relation Age of Onset    Breast Cancer Mother     Other Mother         saldana syndrome    Other Father         dementia    Stroke Father     Heart Attack Maternal Grandfather     Heart Attack Paternal Grandfather        SOCIAL HISTORY  Social History       Tobacco History       Smoking Status  Never      Smokeless Tobacco Use  Never              Alcohol History       Alcohol Use Status  Yes Comment  rarely              Drug Use       Drug Use Status  Never              Sexual Activity       Sexually Active  Not Asked                    [address] [facility] Maintenance   Topic Date Due    COVID-19 Vaccine (1) Never done    DTaP/Tdap/Td vaccine (1 - Tdap) Never done    Flu vaccine (1) Never done    Depression Screen  03/10/2023    Lipids  03/10/2027    Hepatitis C screen  Completed    HIV screen  Completed    Hepatitis A vaccine  Aged Out    Hib vaccine  Aged Out    Meningococcal (ACWY) vaccine  Aged Out    Pneumococcal 0-64 years Vaccine  Aged Out       There is no immunization history on file for this patient. REVIEW OF SYSTEMS  Relevant RoS were stated in the HPI      PHYSICAL EXAM  There were no vitals filed for this visit. There is no height or weight on file to calculate BMI.     Wt Readings from Last 3 Encounters:   08/05/22 179 lb (81.2 kg) 08/01/22 179 lb (81.2 kg)   07/19/22 179 lb (81.2 kg)     BP Readings from Last 3 Encounters:   03/10/22 116/70     Physical Exam  Constitutional:       Appearance: Normal appearance. HENT:      Head: Normocephalic and atraumatic. Nose: No congestion or rhinorrhea. Mouth/Throat:      Mouth: Mucous membranes are moist.      Pharynx: Oropharynx is clear. No oropharyngeal exudate or posterior oropharyngeal erythema. Eyes:      Extraocular Movements: Extraocular movements intact. Conjunctiva/sclera: Conjunctivae normal.   Musculoskeletal:      Cervical back: Normal range of motion. No tenderness. Lymphadenopathy:      Cervical: No cervical adenopathy. Neurological:      Mental Status: He is alert. LAB REVIEW  No visits with results within 6 Month(s) from this visit. Latest known visit with results is:   Hospital Outpatient Visit on 03/10/2022   Component Date Value    Hepatitis C Virus Ab by * 03/10/2022 0.02     Hepatitis C Virus Ab by * 03/10/2022 Negative     HIV Ag/Ab 03/10/2022 Non-Reactive     HIV-1 Antibody 03/10/2022 Non-Reactive     HIV ANTIGEN 03/10/2022 Non-Reactive     HIV-2 Ab 03/10/2022 Non-Reactive     Sodium 03/10/2022 139     Potassium 03/10/2022 4.1     Chloride 03/10/2022 100     CO2 03/10/2022 26     Anion Gap 03/10/2022 13     Glucose 03/10/2022 83     BUN 03/10/2022 13     Creatinine 03/10/2022 1.2     GFR Non- 03/10/2022 >60     GFR  03/10/2022 >60     Calcium 03/10/2022 9.2     Cholesterol, Total 03/10/2022 279 (A)     Triglycerides 03/10/2022 225 (A)     HDL 03/10/2022 54     LDL Calculated 03/10/2022 180 (A)     VLDL Cholesterol Calcula* 03/10/2022 45     Hemoglobin A1C 03/10/2022 5.4     eAG 03/10/2022 108.3      Discussed the relevant lab results with the patient.       ASSESSMENT  Zaida Panchal is a 42 y.o. male presenting as an established patient for acute, chronic, and personalized prevention plan services as noted below.    The plans listed below are in accordance with the latest evidence-based practice guidelines from societies including but not limited to USPSTF, AAFP, ADA, GISELLE/AHA, CESAR, and many others. If the plans deviate from the guidelines or from the latest evidence of practice, they are done so with shared discussions with the patient and by weighing the pros and cons of each individual case. PLAN    1. Upper airway cough syndrome  - not at goal  - dry cough 2/2 postnasal drip (upper airway cough syndrome)  - trial OTC flonase  - RTC if symptoms worsen or fail to improve      HEALTH MAINTENANCE DUE  Health Maintenance Due   Topic Date Due    COVID-19 Vaccine (1) Never done    DTaP/Tdap/Td vaccine (1 - Tdap) Never done    Flu vaccine (1) Never done         RETURN TO [facility]  Return if symptoms worsen or fail to improve. EMR Dragon/transcription disclaimer:  Much of this encounter note is electronic transcription/translation of spoken language to printed texts. The electronic translation of spoken language may be erroneous, or at times, nonsensical words or phrases may be inadvertently transcribed. Although I have reviewed the note for such errors, some may still exist.    Nj Zelaya, was evaluated through a synchronous (real-time) audio-video encounter. The patient (or guardian if applicable) is aware that this is a billable service, which includes applicable co-pays. This Virtual Visit was conducted with patient's (and/or legal guardian's) consent. The visit was conducted pursuant to the emergency declaration under the [address] [place] [address], [address] [place] [address] authority and the [employer] General Act. Patient identification was verified, and a caregiver was present when appropriate. The patient was located at Home: [address] [place] [address].    Provider was located at [facility] ([address] [place] [address]): [address] [place] [address] [address]. Total time spent for this encounter: Not billed by time    --Angela Lane MD PhD on 11/10/2022 at 5:47 PM    An electronic signature was used to authenticate this note.

## 2022-12-09 ENCOUNTER — OFFICE VISIT (OUTPATIENT)
Dept: PRIMARY CARE CLINIC | Age: 42
End: 2022-12-09

## 2022-12-09 VITALS
WEIGHT: 170.8 LBS | BODY MASS INDEX: 23.91 KG/M2 | DIASTOLIC BLOOD PRESSURE: 72 MMHG | SYSTOLIC BLOOD PRESSURE: 116 MMHG | TEMPERATURE: 97 F | OXYGEN SATURATION: 99 % | HEIGHT: 71 IN | HEART RATE: 68 BPM | RESPIRATION RATE: 16 BRPM

## 2022-12-09 DIAGNOSIS — Z15.09 PMS2-RELATED LYNCH SYNDROME (HNPCC4): Primary | ICD-10-CM

## 2022-12-09 DIAGNOSIS — K21.9 GASTROESOPHAGEAL REFLUX DISEASE WITHOUT ESOPHAGITIS: ICD-10-CM

## 2022-12-09 ASSESSMENT — PATIENT HEALTH QUESTIONNAIRE - PHQ9
4. FEELING TIRED OR HAVING LITTLE ENERGY: 0
SUM OF ALL RESPONSES TO PHQ QUESTIONS 1-9: 0
5. POOR APPETITE OR OVEREATING: 0
SUM OF ALL RESPONSES TO PHQ QUESTIONS 1-9: 0
9. THOUGHTS THAT YOU WOULD BE BETTER OFF DEAD, OR OF HURTING YOURSELF: 0
1. LITTLE INTEREST OR PLEASURE IN DOING THINGS: 0
SUM OF ALL RESPONSES TO PHQ QUESTIONS 1-9: 0
SUM OF ALL RESPONSES TO PHQ9 QUESTIONS 1 & 2: 0
3. TROUBLE FALLING OR STAYING ASLEEP: 0
SUM OF ALL RESPONSES TO PHQ QUESTIONS 1-9: 0
8. MOVING OR SPEAKING SO SLOWLY THAT OTHER PEOPLE COULD HAVE NOTICED. OR THE OPPOSITE, BEING SO FIGETY OR RESTLESS THAT YOU HAVE BEEN MOVING AROUND A LOT MORE THAN USUAL: 0
6. FEELING BAD ABOUT YOURSELF - OR THAT YOU ARE A FAILURE OR HAVE LET YOURSELF OR YOUR FAMILY DOWN: 0
10. IF YOU CHECKED OFF ANY PROBLEMS, HOW DIFFICULT HAVE THESE PROBLEMS MADE IT FOR YOU TO DO YOUR WORK, TAKE CARE OF THINGS AT HOME, OR GET ALONG WITH OTHER PEOPLE: 0
7. TROUBLE CONCENTRATING ON THINGS, SUCH AS READING THE NEWSPAPER OR WATCHING TELEVISION: 0
2. FEELING DOWN, DEPRESSED OR HOPELESS: 0

## 2022-12-09 ASSESSMENT — ANXIETY QUESTIONNAIRES
5. BEING SO RESTLESS THAT IT IS HARD TO SIT STILL: 0
IF YOU CHECKED OFF ANY PROBLEMS ON THIS QUESTIONNAIRE, HOW DIFFICULT HAVE THESE PROBLEMS MADE IT FOR YOU TO DO YOUR WORK, TAKE CARE OF THINGS AT HOME, OR GET ALONG WITH OTHER PEOPLE: NOT DIFFICULT AT ALL
4. TROUBLE RELAXING: 0
7. FEELING AFRAID AS IF SOMETHING AWFUL MIGHT HAPPEN: 0
6. BECOMING EASILY ANNOYED OR IRRITABLE: 0
2. NOT BEING ABLE TO STOP OR CONTROL WORRYING: 0
1. FEELING NERVOUS, ANXIOUS, OR ON EDGE: 0
GAD7 TOTAL SCORE: 0
3. WORRYING TOO MUCH ABOUT DIFFERENT THINGS: 0

## 2022-12-09 ASSESSMENT — ENCOUNTER SYMPTOMS
RHINORRHEA: 0
ABDOMINAL PAIN: 0
COUGH: 1
SHORTNESS OF BREATH: 0
DIARRHEA: 0
CONSTIPATION: 0

## 2022-12-09 NOTE — PROGRESS NOTES
Breast Cancer Mother     Other Mother         saldana syndrome    Other Father         dementia    Stroke Father     Heart Attack Maternal Grandfather     Heart Attack Paternal Grandfather          Health Maintenance Completed:  Health Maintenance   Topic Date Due    COVID-19 Vaccine (1) 12/30/2022 (Originally [birth year]1/1980)    DTaP/Tdap/Td vaccine (1 - Tdap) 12/09/2023 (Originally 12/1/1998)    Flu vaccine (1) 12/09/2023 (Originally 8/1/2022)    Depression Screen  03/10/2023    Lipids  03/10/2027    Hepatitis C screen  Completed    HIV screen  Completed    Hepatitis A vaccine  Aged Out    Hib vaccine  Aged Out    Meningococcal (ACWY) vaccine  Aged Out    Pneumococcal 0-64 years Vaccine  Aged Out            There is no immunization history on file for this patient. Review of Systems:  Review of Systems   Constitutional:  Negative for chills and fever. HENT:  Negative for congestion and rhinorrhea. Eyes:  Negative for visual disturbance. Respiratory:  Positive for cough. Negative for shortness of breath. Cardiovascular:  Negative for chest pain. Gastrointestinal:  Negative for abdominal pain, constipation and diarrhea. Genitourinary:  Negative for difficulty urinating. Neurological:  Negative for facial asymmetry and headaches. Physical Exam:   Vitals:    12/09/22 0908   BP: 116/72   Site: Left Upper Arm   Position: Sitting   Cuff Size: Medium Adult   Pulse: 68   Resp: 16   Temp: 97 °F (36.1 °C)   TempSrc: Temporal   SpO2: 99%   Weight: 170 lb 12.8 oz (77.5 kg)   Height: 5' 10.87\" (1.8 m)     Body mass index is 23.91 kg/m². Wt Readings from Last 3 Encounters:   12/09/22 170 lb 12.8 oz (77.5 kg)   08/05/22 179 lb (81.2 kg)   08/01/22 179 lb (81.2 kg)       BP Readings from Last 3 Encounters:   12/09/22 116/72   03/10/22 116/70       Physical Exam  Constitutional:       General: He is not in acute distress. Appearance: Normal appearance. He is normal weight. He is not ill-appearing.    HENT: Head: Normocephalic and atraumatic. Cardiovascular:      Rate and Rhythm: Normal rate and regular rhythm. Pulses: Normal pulses. Heart sounds: Normal heart sounds. No murmur heard. No friction rub. No gallop. Pulmonary:      Effort: Pulmonary effort is normal.      Breath sounds: Normal breath sounds. No wheezing, rhonchi or rales. Abdominal:      General: Abdomen is flat. Bowel sounds are normal. There is no distension. Palpations: Abdomen is soft. Tenderness: There is no abdominal tenderness. Musculoskeletal:         General: Normal range of motion. Skin:     General: Skin is warm and dry. Neurological:      General: No focal deficit present. Mental Status: He is alert and oriented to person, place, and time. Psychiatric:         Mood and Affect: Mood normal.         Behavior: Behavior normal.         Thought Content: Thought content normal.         Judgment: Judgment normal.            Lab Review:   No visits with results within 2 Month(s) from this visit.    Latest known visit with results is:   Hospital Outpatient Visit on 03/10/2022   Component Date Value    Hepatitis C Virus Ab by * 03/10/2022 0.02     Hepatitis C Virus Ab by * 03/10/2022 Negative     HIV Ag/Ab 03/10/2022 Non-Reactive     HIV-1 Antibody 03/10/2022 Non-Reactive     HIV ANTIGEN 03/10/2022 Non-Reactive     HIV-2 Ab 03/10/2022 Non-Reactive     Sodium 03/10/2022 139     Potassium 03/10/2022 4.1     Chloride 03/10/2022 100     CO2 03/10/2022 26     Anion Gap 03/10/2022 13     Glucose 03/10/2022 83     BUN 03/10/2022 13     Creatinine 03/10/2022 1.2     GFR Non- 03/10/2022 >60     GFR  03/10/2022 >60     Calcium 03/10/2022 9.2     Cholesterol, Total 03/10/2022 279 (A)     Triglycerides 03/10/2022 225 (A)     HDL 03/10/2022 54     LDL Calculated 03/10/2022 180 (A)     VLDL Cholesterol Calcula* 03/10/2022 45     Hemoglobin A1C 03/10/2022 5.4     eAG 03/10/2022 108.3 Assessment/Plan:  Patient is a 36year old male with a past medical history of Paez syndrome and GERD who presents today for annual physical.    1. PMS2-related Paez syndrome (HNPCC4)  - Stable  - Colonoscopy on 2/10/2022 that was unremarkable. He will have to have a repeat colonoscopy in 1 year due to his Paez syndrome. He follows with Dr. Shayna Mclean. - Will continue to monitor     2. GERD  - EGD completed on 2/10/2022 showed mild changes to the mucosa  - Patient stopped his Protonix. Had a lengthy discussion about the importance of taking a PPI to prevent further damage to his esophagus. He agreed to try over-the-counter omeprazole 20 mg daily  - Will continue to monitor    Health Maintenance Due:  There are no preventive care reminders to display for this patient. Health care decision maker:  <72years old      Health Maintenance: (USPSTF Recommendations)  (F) Breast Cancer Screen: (40-49 (C), 50-74 biennial screening mammogram (B))  (F) Cervical Cancer Screen: (21-29 q3yr cytology alone; 30-65 q3yr cytology alone, q5yr with hrHPV alone, or q5yr cytology+hrHPV (A))  (M) Prostate Cancer Screen: (54-77 yo discuss benefits/harm, does not recommend testing PSA in men >73 yo (D):   (M) AAA Screen: (men 73-69 yo who has ever smoked (B), consider in nonsmokers if high risk):  CRC/Colonoscopy Screening: (adults 39-53 (B), 50-75 (A))  Lung Ca Screening: Annual LDCT (+smoker age 49-80, smoked within 15 years, total of 20 pack yr history (B)):  DEXA Screen: (women >65 and older, <65 if at risk/postmenopausal (B))  HIV Screen: (16-65 yr old, and all pregnant patients (A)):   Hep C Screen: (18-79 yr old (B)):  HCC Screen: (all pts with cirrhosis and high risk Hep B (US q6 mo)):  Immunizations:    RTC:  Return in about 1 year (around 12/9/2023) for follow up chronic conditions/Physical.     EMR Dragon/transcription disclaimer:  Much of this encounter note is electronic transcription/translation of spoken language to printed texts. The electronic translation of spoken language may be erroneous, or at times, nonsensical words or phrases may be inadvertently transcribed.   Although I have reviewed the note for such errors, some may still exist.       Sarah Cruz DO   PGY-2  [facility]

## 2023-10-28 ENCOUNTER — HOSPITAL ENCOUNTER (EMERGENCY)
Age: 43
Discharge: HOME | End: 2023-10-28
Payer: OTHER MISCELLANEOUS

## 2023-10-28 ENCOUNTER — APPOINTMENT (OUTPATIENT)
Dept: CT IMAGING | Age: 43
End: 2023-10-28
Payer: OTHER MISCELLANEOUS

## 2023-10-28 VITALS
TEMPERATURE: 98 F | SYSTOLIC BLOOD PRESSURE: 121 MMHG | DIASTOLIC BLOOD PRESSURE: 70 MMHG | BODY MASS INDEX: 25.48 KG/M2 | HEIGHT: 69 IN | WEIGHT: 172 LBS | OXYGEN SATURATION: 98 % | RESPIRATION RATE: 15 BRPM | HEART RATE: 70 BPM

## 2023-10-28 DIAGNOSIS — S16.1XXA STRAIN OF NECK MUSCLE, INITIAL ENCOUNTER: ICD-10-CM

## 2023-10-28 DIAGNOSIS — R07.89 CHEST WALL PAIN: ICD-10-CM
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
## 2023-10-28 PROCEDURE — 71250 CT THORAX DX C-: CPT

## 2023-10-28 PROCEDURE — 99284 EMERGENCY DEPT VISIT MOD MDM: CPT

## 2023-10-28 RX ORDER — METHOCARBAMOL 750 MG/1
750 TABLET, FILM COATED ORAL 4 TIMES DAILY
Qty: 40 TABLET | Refills: 0 | Status: SHIPPED | OUTPATIENT
Start: 2023-10-28 | End: 2023-11-07

## 2023-10-28 ASSESSMENT — PAIN DESCRIPTION - FREQUENCY: FREQUENCY: CONTINUOUS

## 2023-10-28 ASSESSMENT — PAIN SCALES - GENERAL: PAINLEVEL_OUTOF10: 8

## 2023-10-28 ASSESSMENT — PAIN DESCRIPTION - LOCATION: LOCATION: NECK;BACK

## 2023-10-28 ASSESSMENT — PAIN DESCRIPTION - PAIN TYPE: TYPE: ACUTE PAIN

## 2023-10-28 ASSESSMENT — PAIN - FUNCTIONAL ASSESSMENT: PAIN_FUNCTIONAL_ASSESSMENT: 0-10

## 2023-10-28 ASSESSMENT — PAIN DESCRIPTION - DESCRIPTORS: DESCRIPTORS: ACHING

## 2023-10-28 ASSESSMENT — PAIN DESCRIPTION - ORIENTATION: ORIENTATION: RIGHT;MID
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
## 2024-09-18 ENCOUNTER — TELEPHONE (OUTPATIENT)
Dept: ORTHOPEDIC SURGERY | Age: 44
End: 2024-09-18

## 2024-12-05 ASSESSMENT — PATIENT HEALTH QUESTIONNAIRE - PHQ9
SUM OF ALL RESPONSES TO PHQ9 QUESTIONS 1 & 2: 0
SUM OF ALL RESPONSES TO PHQ QUESTIONS 1-9: 0
1. LITTLE INTEREST OR PLEASURE IN DOING THINGS: NOT AT ALL
2. FEELING DOWN, DEPRESSED OR HOPELESS: NOT AT ALL
SUM OF ALL RESPONSES TO PHQ QUESTIONS 1-9: 0
1. LITTLE INTEREST OR PLEASURE IN DOING THINGS: NOT AT ALL
2. FEELING DOWN, DEPRESSED OR HOPELESS: NOT AT ALL
SUM OF ALL RESPONSES TO PHQ9 QUESTIONS 1 & 2: 0
SUM OF ALL RESPONSES TO PHQ QUESTIONS 1-9: 0
SUM OF ALL RESPONSES TO PHQ QUESTIONS 1-9: 0

## 2024-12-09 ENCOUNTER — TELEPHONE (OUTPATIENT)
Dept: PRIMARY CARE CLINIC | Age: 44
End: 2024-12-09

## 2024-12-09 ENCOUNTER — HOSPITAL ENCOUNTER (OUTPATIENT)
Age: 44
Setting detail: SPECIMEN
Discharge: HOME | End: 2024-12-09
Payer: COMMERCIAL

## 2024-12-09 ENCOUNTER — OFFICE VISIT (OUTPATIENT)
Dept: PRIMARY CARE CLINIC | Age: 44
End: 2024-12-09
Payer: COMMERCIAL

## 2024-12-09 VITALS
HEIGHT: 69 IN | HEART RATE: 81 BPM | OXYGEN SATURATION: 99 % | BODY MASS INDEX: 27.05 KG/M2 | WEIGHT: 182.6 LBS | DIASTOLIC BLOOD PRESSURE: 78 MMHG | SYSTOLIC BLOOD PRESSURE: 108 MMHG

## 2024-12-09 DIAGNOSIS — Z15.09 LYNCH SYNDROME: ICD-10-CM

## 2024-12-09 DIAGNOSIS — E78.5 DYSLIPIDEMIA: ICD-10-CM

## 2024-12-09 DIAGNOSIS — Z00.00 HEALTHCARE MAINTENANCE: Primary | ICD-10-CM

## 2024-12-09 DIAGNOSIS — Z00.00 HEALTHCARE MAINTENANCE: ICD-10-CM

## 2024-12-09 LAB
ALBUMIN SERPL-MCNC: 4 G/DL (ref 3.4–5)
ALBUMIN/GLOB SERPL: 1.4 {RATIO} (ref 1.1–2.2)
ALP SERPL-CCNC: 70 U/L (ref 40–129)
ALT SERPL-CCNC: 58 U/L (ref 10–40)
ANION GAP SERPL CALCULATED.3IONS-SCNC: 12 MMOL/L (ref 3–16)
AST SERPL-CCNC: 44 U/L (ref 15–37)
BASOPHILS # BLD: 0 K/UL (ref 0–0.2)
BASOPHILS NFR BLD: 0.5 %
BILIRUB SERPL-MCNC: 0.5 MG/DL (ref 0–1)
BUN SERPL-MCNC: 13 MG/DL (ref 7–20)
CALCIUM SERPL-MCNC: 9.7 MG/DL (ref 8.3–10.6)
CHLORIDE SERPL-SCNC: 104 MMOL/L (ref 99–110)
CHOLEST SERPL-MCNC: 230 MG/DL (ref 0–199)
CO2 SERPL-SCNC: 22 MMOL/L (ref 21–32)
CREAT SERPL-MCNC: 1.1 MG/DL (ref 0.9–1.3)
DEPRECATED RDW RBC AUTO: 13.4 % (ref 12.4–15.4)
EOSINOPHIL # BLD: 0.2 K/UL (ref 0–0.6)
EOSINOPHIL NFR BLD: 1.9 %
EST. AVERAGE GLUCOSE BLD GHB EST-MCNC: 99.7 MG/DL
GFR SERPLBLD CREATININE-BSD FMLA CKD-EPI: 84 ML/MIN/{1.73_M2}
GLUCOSE SERPL-MCNC: 83 MG/DL (ref 70–99)
HBA1C MFR BLD: 5.1 %
HCT VFR BLD AUTO: 45.1 % (ref 40.5–52.5)
HDLC SERPL-MCNC: 50 MG/DL (ref 40–60)
HGB BLD-MCNC: 15.3 G/DL (ref 13.5–17.5)
LDLC SERPL CALC-MCNC: 164 MG/DL
LYMPHOCYTES # BLD: 2.2 K/UL (ref 1–5.1)
LYMPHOCYTES NFR BLD: 25.9 %
MCH RBC QN AUTO: 30.3 PG (ref 26–34)
MCHC RBC AUTO-ENTMCNC: 34 G/DL (ref 31–36)
MCV RBC AUTO: 89 FL (ref 80–100)
MONOCYTES # BLD: 0.8 K/UL (ref 0–1.3)
MONOCYTES NFR BLD: 9 %
NEUTROPHILS # BLD: 5.3 K/UL (ref 1.7–7.7)
NEUTROPHILS NFR BLD: 62.7 %
PLATELET # BLD AUTO: 211 K/UL (ref 135–450)
PMV BLD AUTO: 9.5 FL (ref 5–10.5)
POTASSIUM SERPL-SCNC: 4.5 MMOL/L (ref 3.5–5.1)
PROT SERPL-MCNC: 6.8 G/DL (ref 6.4–8.2)
RBC # BLD AUTO: 5.07 M/UL (ref 4.2–5.9)
SODIUM SERPL-SCNC: 138 MMOL/L (ref 136–145)
TRIGL SERPL-MCNC: 78 MG/DL (ref 0–150)
VLDLC SERPL CALC-MCNC: 16 MG/DL
WBC # BLD AUTO: 8.5 K/UL (ref 4–11)

## 2024-12-09 PROCEDURE — 99396 PREV VISIT EST AGE 40-64: CPT | Performed by: STUDENT IN AN ORGANIZED HEALTH CARE EDUCATION/TRAINING PROGRAM

## 2024-12-09 PROCEDURE — 80061 LIPID PANEL: CPT

## 2024-12-09 PROCEDURE — 36415 COLL VENOUS BLD VENIPUNCTURE: CPT

## 2024-12-09 PROCEDURE — 83036 HEMOGLOBIN GLYCOSYLATED A1C: CPT

## 2024-12-09 PROCEDURE — 85025 COMPLETE CBC W/AUTO DIFF WBC: CPT

## 2024-12-09 PROCEDURE — G8484 FLU IMMUNIZE NO ADMIN: HCPCS | Performed by: STUDENT IN AN ORGANIZED HEALTH CARE EDUCATION/TRAINING PROGRAM

## 2024-12-09 PROCEDURE — 80053 COMPREHEN METABOLIC PANEL: CPT

## 2024-12-09 RX ORDER — CHLORAL HYDRATE 500 MG
1000 CAPSULE ORAL DAILY
COMMUNITY

## 2024-12-09 RX ORDER — GLUCOSAMINE HCL
1000 POWDER (GRAM) MISCELLANEOUS DAILY
COMMUNITY

## 2024-12-09 SDOH — ECONOMIC STABILITY: INCOME INSECURITY: HOW HARD IS IT FOR YOU TO PAY FOR THE VERY BASICS LIKE FOOD, HOUSING, MEDICAL CARE, AND HEATING?: NOT HARD AT ALL

## 2024-12-09 SDOH — ECONOMIC STABILITY: FOOD INSECURITY: WITHIN THE PAST 12 MONTHS, THE FOOD YOU BOUGHT JUST DIDN'T LAST AND YOU DIDN'T HAVE MONEY TO GET MORE.: NEVER TRUE

## 2024-12-09 SDOH — ECONOMIC STABILITY: FOOD INSECURITY: WITHIN THE PAST 12 MONTHS, YOU WORRIED THAT YOUR FOOD WOULD RUN OUT BEFORE YOU GOT MONEY TO BUY MORE.: NEVER TRUE

## 2024-12-09 ASSESSMENT — ENCOUNTER SYMPTOMS
ABDOMINAL PAIN: 0
COUGH: 0
CONSTIPATION: 0
RECTAL PAIN: 0
SHORTNESS OF BREATH: 0
BLOOD IN STOOL: 0
ABDOMINAL DISTENTION: 0
NAUSEA: 0
STRIDOR: 0
DIARRHEA: 0
WHEEZING: 0
ANAL BLEEDING: 0
VOMITING: 0

## 2024-12-09 NOTE — TELEPHONE ENCOUNTER
Patient was in office for physical and was told to bring physical from back to have it filled out its scanned into chart and in Dr. Ayala's folder.

## 2024-12-09 NOTE — PROGRESS NOTES
(12/9/2024)    Hunger Vital Sign     Worried About Running Out of Food in the Last Year: Never true     Ran Out of Food in the Last Year: Never true   Transportation Needs: Unknown (12/9/2024)    PRAPARE - Transportation     Lack of Transportation (Medical): Not on file     Lack of Transportation (Non-Medical): No   Physical Activity: Not on file   Stress: Not on file   Social Connections: Not on file   Intimate Partner Violence: Unknown (1/18/2024)    Received from [facility] and [facility]    Interpersonal Safety     Feel physically or emotionally unsafe where currently live: Not on file     Harm by anyone: Not on file     Emotionally Harmed: Not on file   Housing Stability: Unknown (12/9/2024)    Housing Stability Vital Sign     Unable to Pay for Housing in the Last Year: Not on file     Number of Times Moved in the Last Year: Not on file     Homeless in the Last Year: No       Review of Systems:  Review of Systems   Constitutional:  Negative for activity change, appetite change, fatigue and fever.   Respiratory:  Negative for cough, shortness of breath, wheezing and stridor.    Cardiovascular:  Negative for chest pain, palpitations and leg swelling.   Gastrointestinal:  Negative for abdominal distention, abdominal pain, anal bleeding, blood in stool, constipation, diarrhea, nausea, rectal pain and vomiting.       Physical Exam:   Vitals:    12/09/24 0916 12/09/24 0934   BP: 98/62 108/78   Site: Right Upper Arm Right Upper Arm   Position: Sitting Sitting   Cuff Size: Medium Adult    Pulse: 81    SpO2: 99%    Weight: 82.8 kg (182 lb 9.6 oz)    Height: 1.753 m (5' 9.02\")      Body mass index is 26.95 kg/m².    Physical Exam  Vitals and nursing note reviewed.   Constitutional:       General: He is not in acute distress.     Appearance: Normal appearance. He is not ill-appearing.   HENT:      Head: Normocephalic and atraumatic.      Right Ear: External ear normal.      Left Ear: External ear normal.

## 2025-01-15 ENCOUNTER — PATIENT MESSAGE (OUTPATIENT)
Dept: PRIMARY CARE CLINIC | Age: 45
End: 2025-01-15

## 2025-01-15 DIAGNOSIS — Z15.09 PMS2-RELATED LYNCH SYNDROME (HNPCC4): Primary | ICD-10-CM

## 2025-01-15 NOTE — TELEPHONE ENCOUNTER
I have ordered a GI referral to set this up. You should hear from then in the coming days. If not reach out and we will provide you and number to call

## 2025-02-18 NOTE — PROGRESS NOTES
Dustin Metz    Age 44 y.o.    male    1980    MRN [id number]    2/25/2025  Arrival Time_____________  OR Time____________55 Min     Procedure(s):  COLONOSCOPY  ESOPHAGOGASTRODUODENOSCOPY                      Monitor Anesthesia Care    Surgeon(s):  Anh Ashton, MD       Phone [phone] (home)     [facility]  Date  Info Source  Home  Cell         Work  _____________________________________________________________________  _____________________________________________________________________  _____________________________________________________________________  _____________________________________________________________________  _____________________________________________________________________    PCP _____________________________ Phone_________________     H&P  ________________  Bringing      Chart              Epic      DOS      Called________  EKG ________________   Bringing      Chart              Epic      DOS      Called________  LABS________________   Bringing     Chart              Epic      DOS      Called________  Cardiac Clearance ______ Bringing      Chart              Epic      DOS      Called________  Pulmonary Clearance____ Bringing      Chart              Epic      DOS      Called________    Cardiologist________________________ Phone___________________________  Pulmonologist_______________________Phone___________________________    ? Advance Directives   ? Catholic concerns / Waiver on Chart            [employer]________________  ? Pre-op Instructions Given /Understood          _________________________________  ? Directions to Surgery Center                          _________________________________  ? Transportation Home_______________      __________________________________  ? Crutches/Walker__________________        __________________________________    Orders: Hard copy/ EPIC                 Transcribed/ EPIC              _______Wt.    ________Pharmacy

## 2025-02-20 RX ORDER — MULTIVITAMIN WITH IRON
1 TABLET ORAL DAILY
COMMUNITY

## 2025-02-20 NOTE — PROGRESS NOTES
Date and time of surgery : 2/25/25@0730            Arrival Time:  0630     Bring Picture ID and insurance card.  Please wear simple, loose fitting clothing to the hospital.   Do not bring valuables (money, credit cards, checkbooks, etc.)   Do not wear any makeup (including  eye makeup) and no nail polish or artificial nails on your fingers or toes.  DO NOT wear any jewelry or piercings on day of surgery.  All body piercing jewelry must be removed.  If you have dentures, they will be removed before going to the OR; we will provide you a container.  If you wear contact lenses or glasses, they will be removed; please bring a case for them.  Shower the evening before or morning of surgery with antibacterial soap.  Nothing to eat or drink after midnight the day before surgery.   You may brush your teeth and gargle the morning of surgery.  DO NOT SWALLOW WATER.   Do not take any morning meds the day of your surgery.  Aspirin, Ibuprofen, Advil, Naproxen, Vitamin E and other Anti-inflammatory products and supplements should be stopped for 5 -7days before surgery or as directed by your physician.  Do not smoke or drink any alcoholic beverages 24 hours prior to surgery.  This includes NA Beer. Refrain from the usage of any recreational drugs, including non-prescribed prescription drugs.   You MUST plan for a responsible adult to stay on site while you are here and take you home after your surgery. You will not be allowed to leave alone or drive yourself home. It is strongly suggested someone stay with you the first 24 hrs. Your surgery will be cancelled if you do not have a ride home.  To help prevent infection, change your sheets the night before surgery.   If you  have a Living Will and Durable Power of  for Healthcare, please bring in a copy.  Notify your Surgeon if you develop any illness between now and time of surgery. Cough, cold, fever, sore throat, nausea, vomiting, etc.  Please notify your surgeon if you

## 2025-02-25 ENCOUNTER — HOSPITAL ENCOUNTER (OUTPATIENT)
Age: 45
Setting detail: OUTPATIENT SURGERY
Discharge: HOME | End: 2025-02-25
Attending: INTERNAL MEDICINE | Admitting: INTERNAL MEDICINE
Payer: COMMERCIAL

## 2025-02-25 ENCOUNTER — ANESTHESIA (OUTPATIENT)
Dept: ENDOSCOPY | Age: 45
End: 2025-02-25
Payer: COMMERCIAL

## 2025-02-25 ENCOUNTER — ANESTHESIA EVENT (OUTPATIENT)
Dept: ENDOSCOPY | Age: 45
End: 2025-02-25
Payer: COMMERCIAL

## 2025-02-25 VITALS
OXYGEN SATURATION: 99 % | WEIGHT: 176 LBS | HEIGHT: 70 IN | DIASTOLIC BLOOD PRESSURE: 69 MMHG | HEART RATE: 96 BPM | RESPIRATION RATE: 14 BRPM | BODY MASS INDEX: 25.2 KG/M2 | SYSTOLIC BLOOD PRESSURE: 107 MMHG | TEMPERATURE: 98.4 F

## 2025-02-25 DIAGNOSIS — Z15.09 LYNCH SYNDROME: ICD-10-CM

## 2025-02-25 PROCEDURE — 3700000001 HC ADD 15 MINUTES (ANESTHESIA): Performed by: INTERNAL MEDICINE

## 2025-02-25 PROCEDURE — 7100000010 HC PHASE II RECOVERY - FIRST 15 MIN: Performed by: INTERNAL MEDICINE

## 2025-02-25 PROCEDURE — 3609012400 HC EGD TRANSORAL BIOPSY SINGLE/MULTIPLE: Performed by: INTERNAL MEDICINE

## 2025-02-25 PROCEDURE — 6360000002 HC RX W HCPCS: Performed by: NURSE ANESTHETIST, CERTIFIED REGISTERED

## 2025-02-25 PROCEDURE — 3609010300 HC COLONOSCOPY W/BIOPSY SINGLE/MULTIPLE: Performed by: INTERNAL MEDICINE

## 2025-02-25 PROCEDURE — 88305 TISSUE EXAM BY PATHOLOGIST: CPT

## 2025-02-25 PROCEDURE — 7100000011 HC PHASE II RECOVERY - ADDTL 15 MIN: Performed by: INTERNAL MEDICINE

## 2025-02-25 PROCEDURE — 3700000000 HC ANESTHESIA ATTENDED CARE: Performed by: INTERNAL MEDICINE

## 2025-02-25 PROCEDURE — 2709999900 HC NON-CHARGEABLE SUPPLY: Performed by: INTERNAL MEDICINE

## 2025-02-25 PROCEDURE — 88342 IMHCHEM/IMCYTCHM 1ST ANTB: CPT

## 2025-02-25 RX ORDER — OXYCODONE HYDROCHLORIDE 5 MG/1
10 TABLET ORAL PRN
Status: DISCONTINUED | OUTPATIENT
Start: 2025-02-25 | End: 2025-02-25 | Stop reason: HOSPADM

## 2025-02-25 RX ORDER — PROPOFOL 10 MG/ML
INJECTION, EMULSION INTRAVENOUS
Status: DISCONTINUED | OUTPATIENT
Start: 2025-02-25 | End: 2025-02-25 | Stop reason: SDUPTHER

## 2025-02-25 RX ORDER — NALOXONE HYDROCHLORIDE 0.4 MG/ML
INJECTION, SOLUTION INTRAMUSCULAR; INTRAVENOUS; SUBCUTANEOUS PRN
Status: DISCONTINUED | OUTPATIENT
Start: 2025-02-25 | End: 2025-02-25 | Stop reason: HOSPADM

## 2025-02-25 RX ORDER — SODIUM CHLORIDE 9 MG/ML
INJECTION, SOLUTION INTRAVENOUS PRN
Status: DISCONTINUED | OUTPATIENT
Start: 2025-02-25 | End: 2025-02-25 | Stop reason: HOSPADM

## 2025-02-25 RX ORDER — DIPHENHYDRAMINE HYDROCHLORIDE 50 MG/ML
12.5 INJECTION INTRAMUSCULAR; INTRAVENOUS
Status: DISCONTINUED | OUTPATIENT
Start: 2025-02-25 | End: 2025-02-25 | Stop reason: HOSPADM

## 2025-02-25 RX ORDER — OXYCODONE HYDROCHLORIDE 5 MG/1
5 TABLET ORAL PRN
Status: DISCONTINUED | OUTPATIENT
Start: 2025-02-25 | End: 2025-02-25 | Stop reason: HOSPADM

## 2025-02-25 RX ORDER — LIDOCAINE HYDROCHLORIDE 20 MG/ML
INJECTION, SOLUTION EPIDURAL; INFILTRATION; INTRACAUDAL; PERINEURAL
Status: DISCONTINUED | OUTPATIENT
Start: 2025-02-25 | End: 2025-02-25 | Stop reason: SDUPTHER

## 2025-02-25 RX ORDER — LABETALOL HYDROCHLORIDE 5 MG/ML
5 INJECTION, SOLUTION INTRAVENOUS EVERY 10 MIN PRN
Status: DISCONTINUED | OUTPATIENT
Start: 2025-02-25 | End: 2025-02-25 | Stop reason: HOSPADM

## 2025-02-25 RX ORDER — SODIUM CHLORIDE 0.9 % (FLUSH) 0.9 %
5-40 SYRINGE (ML) INJECTION EVERY 12 HOURS SCHEDULED
Status: DISCONTINUED | OUTPATIENT
Start: 2025-02-25 | End: 2025-02-25 | Stop reason: HOSPADM

## 2025-02-25 RX ORDER — SODIUM CHLORIDE, SODIUM LACTATE, POTASSIUM CHLORIDE, CALCIUM CHLORIDE 600; 310; 30; 20 MG/100ML; MG/100ML; MG/100ML; MG/100ML
INJECTION, SOLUTION INTRAVENOUS CONTINUOUS
Status: DISCONTINUED | OUTPATIENT
Start: 2025-02-25 | End: 2025-02-25 | Stop reason: HOSPADM

## 2025-02-25 RX ORDER — MEPERIDINE HYDROCHLORIDE 50 MG/ML
12.5 INJECTION INTRAMUSCULAR; INTRAVENOUS; SUBCUTANEOUS EVERY 5 MIN PRN
Status: DISCONTINUED | OUTPATIENT
Start: 2025-02-25 | End: 2025-02-25 | Stop reason: HOSPADM

## 2025-02-25 RX ORDER — SODIUM CHLORIDE 0.9 % (FLUSH) 0.9 %
5-40 SYRINGE (ML) INJECTION PRN
Status: DISCONTINUED | OUTPATIENT
Start: 2025-02-25 | End: 2025-02-25 | Stop reason: HOSPADM

## 2025-02-25 RX ORDER — LIDOCAINE HYDROCHLORIDE 10 MG/ML
0.3 INJECTION, SOLUTION EPIDURAL; INFILTRATION; INTRACAUDAL; PERINEURAL
Status: DISCONTINUED | OUTPATIENT
Start: 2025-02-25 | End: 2025-02-25 | Stop reason: HOSPADM

## 2025-02-25 RX ORDER — ONDANSETRON 2 MG/ML
4 INJECTION INTRAMUSCULAR; INTRAVENOUS
Status: DISCONTINUED | OUTPATIENT
Start: 2025-02-25 | End: 2025-02-25 | Stop reason: HOSPADM

## 2025-02-25 RX ADMIN — LIDOCAINE HYDROCHLORIDE 100 MG: 20 INJECTION, SOLUTION EPIDURAL; INFILTRATION; INTRACAUDAL at 07:46

## 2025-02-25 RX ADMIN — PROPOFOL 120 MCG/KG/MIN: 10 INJECTION, EMULSION INTRAVENOUS at 07:49

## 2025-02-25 RX ADMIN — PROPOFOL 50 MG: 10 INJECTION, EMULSION INTRAVENOUS at 07:48

## 2025-02-25 RX ADMIN — PROPOFOL 150 MG: 10 INJECTION, EMULSION INTRAVENOUS at 07:46

## 2025-02-25 ASSESSMENT — PAIN SCALES - GENERAL
PAINLEVEL_OUTOF10: 0
PAINLEVEL_OUTOF10: 0

## 2025-02-25 ASSESSMENT — PAIN - FUNCTIONAL ASSESSMENT: PAIN_FUNCTIONAL_ASSESSMENT: 0-10

## 2025-02-25 NOTE — PROGRESS NOTES
Patient admitted to pre-op bay 12 in preparation for endo procedure, VSS. Consent confirmed. IV inserted into right FA, NS infusing. Belongings under stretcher. NPO since 0030. Family at bedside, call light within reach.

## 2025-02-25 NOTE — ANESTHESIA PRE PROCEDURE
\"POCK\", \"POCCL\", \"POCBUN\", \"POCHEMO\", \"POCHCT\" in the last 72 hours.    Coags: No results found for: \"PROTIME\", \"INR\", \"APTT\"    HCG (If Applicable): No results found for: \"PREGTESTUR\", \"PREGSERUM\", \"HCG\", \"HCGQUANT\"     ABGs: No results found for: \"PHART\", \"PO2ART\", \"LLD8RRI\", \"KIO9CUJ\", \"BEART\", \"C8PRIPBH\"     Type & Screen (If Applicable):  No results found for: \"ABORH\", \"LABANTI\"    Drug/Infectious Status (If Applicable):  Lab Results   Component Value Date/Time    HIV Non-Reactive 03/10/2022 02:40 PM       COVID-19 Screening (If Applicable): No results found for: \"COVID19\"        Anesthesia Evaluation  Patient summary reviewed and Nursing notes reviewed  Airway: Mallampati: I  TM distance: >3 FB   Neck ROM: full  Mouth opening: > = 3 FB   Dental: normal exam         Pulmonary:Negative Pulmonary ROS and normal exam  breath sounds clear to auscultation                             Cardiovascular:Negative CV ROS            Rhythm: regular  Rate: normal                    Neuro/Psych:   Negative Neuro/Psych ROS              GI/Hepatic/Renal: Neg GI/Hepatic/Renal ROS            Endo/Other:                      ROS comment: PMS2 Paez syndrome Abdominal:             Vascular: negative vascular ROS.         Other Findings:       Anesthesia Plan      MAC     ASA 1       Induction: intravenous.      Anesthetic plan and risks discussed with patient.      Plan discussed with CRNA.                Patrick Montejo MD   2/25/2025

## 2025-02-25 NOTE — H&P
Gastroenterology Note             Pre-operative History and Physical    Patient: Dustin Metz  : 1980  CSN:     History Obtained From:  patient and/or guardian.     HISTORY OF PRESENT ILLNESS:    The patient is a 44 y.o. male  here for EGD/colonoscopy.     Past Medical History:    Past Medical History:   Diagnosis Date    Biceps muscle tear     right    Paez syndrome     Prolonged emergence from general anesthesia      Past Surgical History:    Past Surgical History:   Procedure Laterality Date    COLONOSCOPY          KNEE ARTHROPLASTY Right 2011    NASAL SEPTUM SURGERY       Medications Prior to Admission:   No current facility-administered medications on file prior to encounter.     Current Outpatient Medications on File Prior to Encounter   Medication Sig Dispense Refill    Multiple Vitamin (MULTIVITAMIN) TABS tablet Take 1 tablet by mouth daily      omeprazole (PRILOSEC) 20 MG delayed release capsule Take 1 capsule by mouth daily      ipratropium (ATROVENT) 0.06 % nasal spray       Glucosamine HCl POWD Take 1,000 g by mouth daily      Omega-3 Fatty Acids (FISH OIL) 1000 MG capsule Take 1 capsule by mouth daily          Allergies:  Patient has no known allergies.      Social History:   Social History     Tobacco Use    Smoking status: Never    Smokeless tobacco: Never   Substance Use Topics    Alcohol use: Yes     Comment: rarely     Family History:   Family History   Problem Relation Age of Onset    Breast Cancer Mother     Other Mother         paez syndrome    Other Father         dementia    Stroke Father     Heart Attack Maternal Grandfather     Heart Attack Paternal Grandfather        PHYSICAL EXAM:      /74   Pulse (!) 102   Temp 98.4 °F (36.9 °C) (Oral)   Resp 16   Ht 1.778 m (5' 10\")   Wt 79.8 kg (176 lb)   SpO2 99%   BMI 25.25 kg/m²  I        Heart:   RRR, normal s1s2    Lungs:  CTA bilat,  Normal effort    Abdomen:   NT, ND      ASA Grade:  ASA 1 - Normal health

## 2025-02-25 NOTE — DISCHARGE INSTRUCTIONS
after your doctor or nurse checks to make sure you are not having any problems.  You may have to stay overnight if you had treatment during the test. You may have a sore throat for a day or two after the test.  This care sheet gives you a general idea about what to expect after the test.  How can you care for yourself at home?  Activity  Rest as much as you need to after you go home.  You should be able to go back to your usual activities the day after the test.  Diet  Follow your doctor's directions for eating after the test.  Drink plenty of fluids (unless your doctor has told you not to).  Medications  If you have a sore throat the day after the test, use an over-the-counter spray to numb your throat.  Follow-up care is a key part of your treatment and safety. Be sure to make and go to all appointments, and call your doctor if you are having problems. It's also a good idea to know your test results and keep a list of the medicines you take.  When should you call for help?  Call 911 anytime you think you may need emergency care. For example, call if:    You passed out (loses consciousness).     You have trouble breathing.     You pass maroon or bloody stools.    Call your doctor now or seek immediate medical care if:    You have pain that does not get better after your take pain medicine.     You have new or worse belly pain.     You have blood in your stools.     You are sick to your stomach and cannot keep fluids down.     You have a fever.     You cannot pass stools or gas.    Watch closely for changes in your health, and be sure to contact your doctor if:    Your throat still hurts after a day or two.     You do not get better as expected.   Where can you learn more?  Go to [email or web address] and sign in to your [employer] account. Enter J454 in the Search Health Information box to learn more about \"Upper GI Endoscopy: What to Expect at Home.\"     If you do not have an account, please click

## 2025-02-25 NOTE — ANESTHESIA POSTPROCEDURE EVALUATION
Department of Anesthesiology  Postprocedure Note    Patient: Dustin Metz  MRN: [id number]  YOB: 1980  Date of evaluation: 2/25/2025    Procedure Summary       Date: 02/25/25 Room / Location: [place] [address] / [facility]    Anesthesia Start: 0741 Anesthesia Stop: 0820    Procedures:       COLONOSCOPY BIOPSY      ESOPHAGOGASTRODUODENOSCOPY BIOPSY Diagnosis:       Paez syndrome      (Paez syndrome [Z15.09])    Surgeons: Anh Ashton MD Responsible Provider: Patrick Montejo MD    Anesthesia Type: MAC ASA Status: 1            Anesthesia Type: No value filed.    Kitty Phase I: Kitty Score: 10    Kitty Phase II: Kitty Score: 10    Anesthesia Post Evaluation    Patient location during evaluation: PACU  Patient participation: complete - patient participated  Level of consciousness: awake and alert  Pain score: 0  Airway patency: patent  Nausea & Vomiting: no nausea and no vomiting  Cardiovascular status: blood pressure returned to baseline  Respiratory status: acceptable  Hydration status: stable  Pain management: adequate    No notable events documented.

## 2025-03-28 ENCOUNTER — HOSPITAL ENCOUNTER (OUTPATIENT)
Dept: GENERAL RADIOLOGY | Age: 45
Discharge: HOME | End: 2025-03-28
Payer: COMMERCIAL

## 2025-03-28 ENCOUNTER — RESULTS FOLLOW-UP (OUTPATIENT)
Dept: PRIMARY CARE CLINIC | Age: 45
End: 2025-03-28

## 2025-03-28 ENCOUNTER — HOSPITAL ENCOUNTER (OUTPATIENT)
Age: 45
Discharge: HOME | End: 2025-03-28
Payer: COMMERCIAL

## 2025-03-28 ENCOUNTER — OFFICE VISIT (OUTPATIENT)
Dept: PRIMARY CARE CLINIC | Age: 45
End: 2025-03-28
Payer: COMMERCIAL

## 2025-03-28 VITALS
WEIGHT: 184.4 LBS | HEART RATE: 72 BPM | RESPIRATION RATE: 16 BRPM | HEIGHT: 70 IN | BODY MASS INDEX: 26.4 KG/M2 | OXYGEN SATURATION: 99 % | DIASTOLIC BLOOD PRESSURE: 72 MMHG | SYSTOLIC BLOOD PRESSURE: 128 MMHG | TEMPERATURE: 97.5 F

## 2025-03-28 DIAGNOSIS — R05.3 CHRONIC COUGH: ICD-10-CM

## 2025-03-28 DIAGNOSIS — K21.00 GASTROESOPHAGEAL REFLUX DISEASE WITH ESOPHAGITIS WITHOUT HEMORRHAGE: ICD-10-CM

## 2025-03-28 DIAGNOSIS — R05.3 CHRONIC COUGH: Primary | ICD-10-CM

## 2025-03-28 PROBLEM — K21.9 GASTROESOPHAGEAL REFLUX DISEASE: Status: ACTIVE | Noted: 2025-03-28

## 2025-03-28 PROCEDURE — 71046 X-RAY EXAM CHEST 2 VIEWS: CPT

## 2025-03-28 PROCEDURE — 99213 OFFICE O/P EST LOW 20 MIN: CPT

## 2025-03-28 PROCEDURE — G8427 DOCREV CUR MEDS BY ELIG CLIN: HCPCS

## 2025-03-28 PROCEDURE — G8419 CALC BMI OUT NRM PARAM NOF/U: HCPCS

## 2025-03-28 PROCEDURE — 1036F TOBACCO NON-USER: CPT

## 2025-03-28 RX ORDER — PANTOPRAZOLE SODIUM 40 MG/1
40 TABLET, DELAYED RELEASE ORAL
Qty: 90 TABLET | Refills: 1 | Status: SHIPPED | OUTPATIENT
Start: 2025-03-28

## 2025-03-28 SDOH — ECONOMIC STABILITY: FOOD INSECURITY: WITHIN THE PAST 12 MONTHS, YOU WORRIED THAT YOUR FOOD WOULD RUN OUT BEFORE YOU GOT MONEY TO BUY MORE.: NEVER TRUE

## 2025-03-28 SDOH — ECONOMIC STABILITY: FOOD INSECURITY: WITHIN THE PAST 12 MONTHS, THE FOOD YOU BOUGHT JUST DIDN'T LAST AND YOU DIDN'T HAVE MONEY TO GET MORE.: NEVER TRUE

## 2025-03-28 ASSESSMENT — PATIENT HEALTH QUESTIONNAIRE - PHQ9
1. LITTLE INTEREST OR PLEASURE IN DOING THINGS: NOT AT ALL
2. FEELING DOWN, DEPRESSED OR HOPELESS: NOT AT ALL
SUM OF ALL RESPONSES TO PHQ QUESTIONS 1-9: 0

## 2025-03-28 ASSESSMENT — ENCOUNTER SYMPTOMS
WHEEZING: 0
RHINORRHEA: 0
COUGH: 1
SHORTNESS OF BREATH: 0
SORE THROAT: 0

## 2025-03-28 NOTE — PROGRESS NOTES
Dustin Metz (:  1980) is a 44 y.o. male,Established patient, here for evaluation of the following chief complaint(s):  Cough (Had wet cough over month ago did video visit was given 3 days steroid starting getting better and now is coming back bad again /Starts when he is having to breath and talk has done over the counter to try and help)      Assessment & Plan  Chronic cough   Acute condition, new, CXR.  Likely secondary to GERD with esophagitis, cannot rule out RAD. Will further evaluate with CXR to rule out PNA. If further treatment with increase in PPI does not help as below, can consider further evaluation of RAD/Asthma with PFT and JENNA for relief.    Orders:    XR CHEST STANDARD (2 VW); Future    Gastroesophageal reflux disease with esophagitis without hemorrhage   Chronic, not at goal (unstable), increased PPI dosing from omeprazole 20mg to pantoprazole 40mg daily.  Esophagitits noted on most recent EGD completed by GI for Paez syndrome  Orders:    pantoprazole (PROTONIX) 40 MG tablet; Take 1 tablet by mouth every morning (before breakfast)      Return in about 4 weeks (around 2025) for Follow up of cough.    Subjective       Cough  This is a new problem. The current episode started more than 1 month ago. The problem has been unchanged. The problem occurs constantly. Cough characteristics: initially productive and now more dry. Pertinent negatives include no chest pain, chills, fever, headaches, nasal congestion, postnasal drip, rhinorrhea, sore throat, shortness of breath or wheezing. The symptoms are aggravated by exercise (lots of talking). He has tried oral steroids (zyrtec and flonase) for the symptoms. The treatment provided mild relief. There is no history of asthma, bronchitis, COPD, environmental allergies or pneumonia.     Review of Systems   Constitutional:  Negative for chills and fever.   HENT:  Negative for postnasal drip, rhinorrhea and sore throat.    Respiratory:  Positive

## 2025-03-28 NOTE — ASSESSMENT & PLAN NOTE
Acute condition, new, CXR.  Likely secondary to GERD with esophagitis, cannot rule out RAD. Will further evaluate with CXR to rule out PNA. If further treatment with increase in PPI does not help as below, can consider further evaluation of RAD/Asthma with PFT and JENNA for relief.    Orders:    XR CHEST STANDARD (2 VW); Future

## 2025-03-28 NOTE — ASSESSMENT & PLAN NOTE
Chronic, not at goal (unstable), increased PPI dosing from omeprazole 20mg to pantoprazole 40mg daily.  Esophagitits noted on most recent EGD completed by GI for Paez syndrome  Orders:    pantoprazole (PROTONIX) 40 MG tablet; Take 1 tablet by mouth every morning (before breakfast)

## 2025-05-23 ENCOUNTER — OFFICE VISIT (OUTPATIENT)
Dept: PRIMARY CARE CLINIC | Age: 45
End: 2025-05-23

## 2025-05-23 VITALS
DIASTOLIC BLOOD PRESSURE: 74 MMHG | WEIGHT: 179.2 LBS | OXYGEN SATURATION: 97 % | RESPIRATION RATE: 16 BRPM | BODY MASS INDEX: 25.65 KG/M2 | SYSTOLIC BLOOD PRESSURE: 120 MMHG | HEIGHT: 70 IN | HEART RATE: 74 BPM

## 2025-05-23 DIAGNOSIS — K21.00 GASTROESOPHAGEAL REFLUX DISEASE WITH ESOPHAGITIS WITHOUT HEMORRHAGE: ICD-10-CM

## 2025-05-23 DIAGNOSIS — J35.8 TONSIL STONE: Primary | ICD-10-CM

## 2025-05-23 RX ORDER — PANTOPRAZOLE SODIUM 40 MG/1
40 TABLET, DELAYED RELEASE ORAL NIGHTLY
Qty: 30 TABLET | Refills: 0 | Status: SHIPPED | OUTPATIENT
Start: 2025-05-23 | End: 2025-06-22

## 2025-05-23 NOTE — ASSESSMENT & PLAN NOTE
- Chronic, improving  - Increased PPI to Pantoprazole 40mg BID for 1 month for symptomatic management, to be reduced to daily afterwards due to concerns of Esophagitis as noted on most recent EGD completed by GI for history of Paez syndrome  - Consider adding Flonase if symptoms do not improve with high dose PPI  - Encouraged follow up with GI if symptoms persist despite current treatment plan    Orders:    pantoprazole (PROTONIX) 40 MG tablet; Take 1 tablet by mouth at bedtime

## 2025-05-23 NOTE — PROGRESS NOTES
Dustin Metz (:  1980) is a 44 y.o. male,Established patient, here for evaluation of the following chief complaint(s):  Gastroesophageal Reflux (Patient feels like he has something stuck in his throat for week now patient states has not felt heart burn pain /States feels high up states that when he trys to cough makes it feel worse )      Assessment & Plan  Gastroesophageal reflux disease with esophagitis without hemorrhage  - Chronic, improving  - Increased PPI to Pantoprazole 40mg BID for 1 month for symptomatic management, to be reduced to daily afterwards due to concerns of Esophagitis as noted on most recent EGD completed by GI for history of Paez syndrome  - Consider adding Flonase if symptoms do not improve with high dose PPI  - Encouraged follow up with GI if symptoms persist despite current treatment plan    Orders:    pantoprazole (PROTONIX) 40 MG tablet; Take 1 tablet by mouth at bedtime    Tonsil stone  Advised on conservative management with saline rinses, unlikely to be causing patient's symptoms           No follow-ups on file.    Subjective       Globus sensation  Worse for the last week  Cough significantly improved with the pantoprazole  Concerned about anything being stuck in his throat  No nausea or vomiting or sensation of food getting stuck when swallowing  No concerns of breathing  Recent EGD indicating esophagitis      Review of Systems            Objective     /74 (BP Site: Left Upper Arm, Patient Position: Sitting, BP Cuff Size: Large Adult)   Pulse 74   Resp 16   Ht 1.778 m (5' 10\")   Wt 81.3 kg (179 lb 3.2 oz)   SpO2 97%   BMI 25.71 kg/m²      Physical Exam  Constitutional:       Appearance: Normal appearance.   HENT:      Head: Normocephalic.      Mouth/Throat:      Comments: Tonsil stone noted on left, approx 3mm, not likely causing patients symptoms  Eyes:      Conjunctiva/sclera: Conjunctivae normal.   Cardiovascular:      Rate and Rhythm: Normal rate and

## (undated) DEVICE — ELECTRODE ECG MONITR FOAM TEAR DROP ADLT RED

## (undated) DEVICE — ENDOSCOPIC KIT 2 12 FT OP4 DE2 GWN SYR

## (undated) DEVICE — ELECTRODE,ECG,STRESS,FOAM,3PK: Brand: MEDLINE

## (undated) DEVICE — CANNULA NSL AD TBNG L7FT PVC STR NONFLARED PRNG O2 DEL W STD

## (undated) DEVICE — FORCEPS BX L240CM JAW DIA2.8MM L CAP W/ NDL MIC MESH TOOTH

## (undated) DEVICE — CONMED SCOPE SAVER BITE BLOCK, 20X27 MM: Brand: SCOPE SAVER